# Patient Record
Sex: MALE | Race: WHITE | NOT HISPANIC OR LATINO | Employment: OTHER | URBAN - NONMETROPOLITAN AREA
[De-identification: names, ages, dates, MRNs, and addresses within clinical notes are randomized per-mention and may not be internally consistent; named-entity substitution may affect disease eponyms.]

---

## 2017-01-14 ENCOUNTER — HOSPITAL ENCOUNTER (OUTPATIENT)
Facility: HOSPITAL | Age: 82
Setting detail: OBSERVATION
Discharge: HOME | End: 2017-01-15
Attending: FAMILY MEDICINE | Admitting: FAMILY MEDICINE

## 2017-01-14 ENCOUNTER — APPOINTMENT (OUTPATIENT)
Dept: GENERAL RADIOLOGY | Facility: HOSPITAL | Age: 82
End: 2017-01-14

## 2017-01-14 ENCOUNTER — APPOINTMENT (OUTPATIENT)
Dept: CT IMAGING | Facility: HOSPITAL | Age: 82
End: 2017-01-14

## 2017-01-14 DIAGNOSIS — R42 DIZZINESS: ICD-10-CM

## 2017-01-14 DIAGNOSIS — R93.0 ABNORMAL CT SCAN, HEAD: ICD-10-CM

## 2017-01-14 DIAGNOSIS — R07.89 OTHER CHEST PAIN: Primary | ICD-10-CM

## 2017-01-14 PROBLEM — J32.9 SINUSITIS: Status: ACTIVE | Noted: 2017-01-14

## 2017-01-14 PROBLEM — I48.91 FIBRILLATION, ATRIAL (HCC): Status: ACTIVE | Noted: 2017-01-14

## 2017-01-14 PROBLEM — R07.9 CHEST PAIN: Status: ACTIVE | Noted: 2017-01-14

## 2017-01-14 PROBLEM — K21.00 REFLUX ESOPHAGITIS: Status: ACTIVE | Noted: 2017-01-14

## 2017-01-14 LAB
ALBUMIN SERPL-MCNC: 4.2 G/DL (ref 3.5–5)
ALBUMIN/GLOB SERPL: 1 G/DL (ref 1.1–2.5)
ALP SERPL-CCNC: 94 U/L (ref 24–120)
ALT SERPL W P-5'-P-CCNC: 29 U/L (ref 0–54)
AMYLASE SERPL-CCNC: 75 U/L (ref 30–110)
ANION GAP SERPL CALCULATED.3IONS-SCNC: 15 MMOL/L (ref 4–13)
APTT PPP: 41 SECONDS (ref 24.1–34.8)
AST SERPL-CCNC: 27 U/L (ref 7–45)
BASOPHILS # BLD AUTO: 0.05 10*3/MM3 (ref 0–0.2)
BASOPHILS NFR BLD AUTO: 0.5 % (ref 0–2)
BILIRUB SERPL-MCNC: 0.8 MG/DL (ref 0.1–1)
BILIRUB UR QL STRIP: NEGATIVE
BUN BLD-MCNC: 29 MG/DL (ref 5–21)
BUN/CREAT SERPL: 26.1 (ref 7–25)
CALCIUM SPEC-SCNC: 8.9 MG/DL (ref 8.4–10.4)
CHLORIDE SERPL-SCNC: 100 MMOL/L (ref 98–110)
CK MB SERPL-CCNC: 0.98 NG/ML (ref 0–5)
CK SERPL-CCNC: 33 U/L (ref 0–203)
CLARITY UR: CLEAR
CO2 SERPL-SCNC: 28 MMOL/L (ref 24–31)
COLOR UR: YELLOW
CREAT BLD-MCNC: 1.11 MG/DL (ref 0.5–1.4)
DEPRECATED RDW RBC AUTO: 42.4 FL (ref 40–54)
EOSINOPHIL # BLD AUTO: 0.11 10*3/MM3 (ref 0–0.7)
EOSINOPHIL NFR BLD AUTO: 1.1 % (ref 0–4)
ERYTHROCYTE [DISTWIDTH] IN BLOOD BY AUTOMATED COUNT: 13.1 % (ref 12–15)
ERYTHROCYTE [SEDIMENTATION RATE] IN BLOOD: 19 MM/HR (ref 0–15)
FLUAV AG NPH QL: NEGATIVE
FLUBV AG NPH QL IA: NEGATIVE
GFR SERPL CREATININE-BSD FRML MDRD: 63 ML/MIN/1.73
GLOBULIN UR ELPH-MCNC: 4.1 GM/DL
GLUCOSE BLD-MCNC: 102 MG/DL (ref 70–100)
GLUCOSE UR STRIP-MCNC: NEGATIVE MG/DL
HCT VFR BLD AUTO: 36.6 % (ref 40–52)
HGB BLD-MCNC: 12.2 G/DL (ref 14–18)
HGB UR QL STRIP.AUTO: NEGATIVE
IMM GRANULOCYTES # BLD: 0.02 10*3/MM3 (ref 0–0.03)
IMM GRANULOCYTES NFR BLD: 0.2 % (ref 0–5)
INR PPP: 1.36 (ref 0.91–1.09)
KETONES UR QL STRIP: NEGATIVE
LEUKOCYTE ESTERASE UR QL STRIP.AUTO: NEGATIVE
LIPASE SERPL-CCNC: 141 U/L (ref 23–203)
LYMPHOCYTES # BLD AUTO: 4.08 10*3/MM3 (ref 0.72–4.86)
LYMPHOCYTES NFR BLD AUTO: 40.4 % (ref 15–45)
MCH RBC QN AUTO: 29.6 PG (ref 28–32)
MCHC RBC AUTO-ENTMCNC: 33.3 G/DL (ref 33–36)
MCV RBC AUTO: 88.8 FL (ref 82–95)
MONOCYTES # BLD AUTO: 0.7 10*3/MM3 (ref 0.19–1.3)
MONOCYTES NFR BLD AUTO: 6.9 % (ref 4–12)
NEUTROPHILS # BLD AUTO: 5.13 10*3/MM3 (ref 1.87–8.4)
NEUTROPHILS NFR BLD AUTO: 50.9 % (ref 39–78)
NITRITE UR QL STRIP: NEGATIVE
NT-PROBNP SERPL-MCNC: 1670 PG/ML (ref 0–1800)
PH UR STRIP.AUTO: 6.5 [PH] (ref 5–8)
PLATELET # BLD AUTO: 281 10*3/MM3 (ref 130–400)
PMV BLD AUTO: 9.5 FL (ref 6–12)
POTASSIUM BLD-SCNC: 4.2 MMOL/L (ref 3.5–5.3)
PROT SERPL-MCNC: 8.3 G/DL (ref 6.3–8.7)
PROT UR QL STRIP: NEGATIVE
PROTHROMBIN TIME: 17.2 SECONDS (ref 11.9–14.6)
RBC # BLD AUTO: 4.12 10*6/MM3 (ref 4.8–5.9)
SODIUM BLD-SCNC: 143 MMOL/L (ref 135–145)
SP GR UR STRIP: 1.02 (ref 1–1.03)
TROPONIN I SERPL-MCNC: 0 NG/ML (ref 0–0.07)
TROPONIN I SERPL-MCNC: 0 NG/ML (ref 0–0.07)
TROPONIN I SERPL-MCNC: 0.01 NG/ML (ref 0–0.03)
TROPONIN I SERPL-MCNC: 0.02 NG/ML (ref 0–0.03)
TROPONIN I SERPL-MCNC: <0.012 NG/ML (ref 0–0.03)
UROBILINOGEN UR QL STRIP: NORMAL
WBC NRBC COR # BLD: 10.09 10*3/MM3 (ref 4.8–10.8)

## 2017-01-14 PROCEDURE — 25010000002 ONDANSETRON PER 1 MG: Performed by: PHYSICIAN ASSISTANT

## 2017-01-14 PROCEDURE — 87040 BLOOD CULTURE FOR BACTERIA: CPT | Performed by: PHYSICIAN ASSISTANT

## 2017-01-14 PROCEDURE — 83880 ASSAY OF NATRIURETIC PEPTIDE: CPT | Performed by: PHYSICIAN ASSISTANT

## 2017-01-14 PROCEDURE — 87804 INFLUENZA ASSAY W/OPTIC: CPT | Performed by: PHYSICIAN ASSISTANT

## 2017-01-14 PROCEDURE — 70450 CT HEAD/BRAIN W/O DYE: CPT

## 2017-01-14 PROCEDURE — 84484 ASSAY OF TROPONIN QUANT: CPT

## 2017-01-14 PROCEDURE — G0378 HOSPITAL OBSERVATION PER HR: HCPCS

## 2017-01-14 PROCEDURE — 80053 COMPREHEN METABOLIC PANEL: CPT | Performed by: PHYSICIAN ASSISTANT

## 2017-01-14 PROCEDURE — 93010 ELECTROCARDIOGRAM REPORT: CPT | Performed by: INTERNAL MEDICINE

## 2017-01-14 PROCEDURE — 84484 ASSAY OF TROPONIN QUANT: CPT | Performed by: FAMILY MEDICINE

## 2017-01-14 PROCEDURE — 96374 THER/PROPH/DIAG INJ IV PUSH: CPT

## 2017-01-14 PROCEDURE — 96365 THER/PROPH/DIAG IV INF INIT: CPT

## 2017-01-14 PROCEDURE — 93005 ELECTROCARDIOGRAM TRACING: CPT | Performed by: PHYSICIAN ASSISTANT

## 2017-01-14 PROCEDURE — 85730 THROMBOPLASTIN TIME PARTIAL: CPT | Performed by: PHYSICIAN ASSISTANT

## 2017-01-14 PROCEDURE — 81003 URINALYSIS AUTO W/O SCOPE: CPT | Performed by: PHYSICIAN ASSISTANT

## 2017-01-14 PROCEDURE — 25010000002 CEFTRIAXONE: Performed by: FAMILY MEDICINE

## 2017-01-14 PROCEDURE — 85651 RBC SED RATE NONAUTOMATED: CPT | Performed by: PHYSICIAN ASSISTANT

## 2017-01-14 PROCEDURE — 82553 CREATINE MB FRACTION: CPT | Performed by: FAMILY MEDICINE

## 2017-01-14 PROCEDURE — 83690 ASSAY OF LIPASE: CPT | Performed by: PHYSICIAN ASSISTANT

## 2017-01-14 PROCEDURE — 96375 TX/PRO/DX INJ NEW DRUG ADDON: CPT

## 2017-01-14 PROCEDURE — 99284 EMERGENCY DEPT VISIT MOD MDM: CPT

## 2017-01-14 PROCEDURE — 71010 HC CHEST PA OR AP: CPT

## 2017-01-14 PROCEDURE — 96366 THER/PROPH/DIAG IV INF ADDON: CPT

## 2017-01-14 PROCEDURE — 82150 ASSAY OF AMYLASE: CPT | Performed by: PHYSICIAN ASSISTANT

## 2017-01-14 PROCEDURE — 85025 COMPLETE CBC W/AUTO DIFF WBC: CPT | Performed by: PHYSICIAN ASSISTANT

## 2017-01-14 PROCEDURE — 85610 PROTHROMBIN TIME: CPT | Performed by: PHYSICIAN ASSISTANT

## 2017-01-14 PROCEDURE — 82550 ASSAY OF CK (CPK): CPT | Performed by: FAMILY MEDICINE

## 2017-01-14 PROCEDURE — 96361 HYDRATE IV INFUSION ADD-ON: CPT

## 2017-01-14 PROCEDURE — 93005 ELECTROCARDIOGRAM TRACING: CPT

## 2017-01-14 RX ORDER — SODIUM CHLORIDE 9 MG/ML
125 INJECTION, SOLUTION INTRAVENOUS CONTINUOUS
Status: DISCONTINUED | OUTPATIENT
Start: 2017-01-14 | End: 2017-01-14

## 2017-01-14 RX ORDER — LABETALOL HYDROCHLORIDE 5 MG/ML
10 INJECTION, SOLUTION INTRAVENOUS ONCE
Status: DISCONTINUED | OUTPATIENT
Start: 2017-01-14 | End: 2017-01-14

## 2017-01-14 RX ORDER — ONDANSETRON 2 MG/ML
4 INJECTION INTRAMUSCULAR; INTRAVENOUS EVERY 6 HOURS PRN
Status: DISCONTINUED | OUTPATIENT
Start: 2017-01-14 | End: 2017-01-15 | Stop reason: HOSPADM

## 2017-01-14 RX ORDER — ONDANSETRON 2 MG/ML
4 INJECTION INTRAMUSCULAR; INTRAVENOUS ONCE
Status: COMPLETED | OUTPATIENT
Start: 2017-01-14 | End: 2017-01-14

## 2017-01-14 RX ORDER — ONDANSETRON 4 MG/1
4 TABLET, FILM COATED ORAL EVERY 6 HOURS PRN
Status: DISCONTINUED | OUTPATIENT
Start: 2017-01-14 | End: 2017-01-15 | Stop reason: HOSPADM

## 2017-01-14 RX ORDER — TETRACAINE HYDROCHLORIDE 5 MG/ML
2 SOLUTION OPHTHALMIC ONCE
Status: COMPLETED | OUTPATIENT
Start: 2017-01-14 | End: 2017-01-14

## 2017-01-14 RX ORDER — SODIUM CHLORIDE 450 MG/100ML
50 INJECTION, SOLUTION INTRAVENOUS CONTINUOUS
Status: DISCONTINUED | OUTPATIENT
Start: 2017-01-14 | End: 2017-01-15 | Stop reason: HOSPADM

## 2017-01-14 RX ORDER — ONDANSETRON 4 MG/1
4 TABLET, ORALLY DISINTEGRATING ORAL EVERY 6 HOURS PRN
Status: DISCONTINUED | OUTPATIENT
Start: 2017-01-14 | End: 2017-01-15 | Stop reason: HOSPADM

## 2017-01-14 RX ORDER — SODIUM CHLORIDE 0.9 % (FLUSH) 0.9 %
1-10 SYRINGE (ML) INJECTION AS NEEDED
Status: DISCONTINUED | OUTPATIENT
Start: 2017-01-14 | End: 2017-01-15 | Stop reason: HOSPADM

## 2017-01-14 RX ORDER — PANTOPRAZOLE SODIUM 40 MG/10ML
40 INJECTION, POWDER, LYOPHILIZED, FOR SOLUTION INTRAVENOUS
Status: DISCONTINUED | OUTPATIENT
Start: 2017-01-15 | End: 2017-01-15 | Stop reason: HOSPADM

## 2017-01-14 RX ORDER — ATORVASTATIN CALCIUM 10 MG/1
10 TABLET, FILM COATED ORAL NIGHTLY
Status: DISCONTINUED | OUTPATIENT
Start: 2017-01-14 | End: 2017-01-15 | Stop reason: HOSPADM

## 2017-01-14 RX ADMIN — FLUORESCEIN SODIUM 1 STRIP: 1 STRIP OPHTHALMIC at 09:00

## 2017-01-14 RX ADMIN — TETRACAINE HYDROCHLORIDE 2 DROP: 5 SOLUTION OPHTHALMIC at 09:00

## 2017-01-14 RX ADMIN — ATORVASTATIN CALCIUM 10 MG: 10 TABLET, FILM COATED ORAL at 19:47

## 2017-01-14 RX ADMIN — CEFTRIAXONE 1 G: 1 INJECTION, POWDER, FOR SOLUTION INTRAMUSCULAR; INTRAVENOUS at 16:49

## 2017-01-14 RX ADMIN — SODIUM CHLORIDE 125 ML/HR: 9 INJECTION, SOLUTION INTRAVENOUS at 08:59

## 2017-01-14 RX ADMIN — ONDANSETRON 4 MG: 2 INJECTION INTRAMUSCULAR; INTRAVENOUS at 08:59

## 2017-01-14 RX ADMIN — RIVAROXABAN 20 MG: 20 TABLET, FILM COATED ORAL at 17:45

## 2017-01-14 RX ADMIN — SODIUM CHLORIDE 50 ML/HR: 4.5 INJECTION, SOLUTION INTRAVENOUS at 16:49

## 2017-01-14 NOTE — PLAN OF CARE
Problem: Patient Care Overview (Adult)  Goal: Plan of Care Review    01/14/17 1518   Coping/Psychosocial Response Interventions   Plan Of Care Reviewed With patient;spouse   Patient Care Overview   Progress no change   Outcome Evaluation   Outcome Summary/Follow up Plan admission

## 2017-01-14 NOTE — ED PROVIDER NOTES
"Subjective   Patient is a 82 y.o. male presenting with dizziness.   Dizziness   Associated symptoms: chest pain, headaches, hearing loss, nausea, vomiting and weakness    Associated symptoms: no diarrhea      Patient is an 82-year-old  male is present ED with family.  Both are vague and difficult historians.  From what I can gather, the patient experienced dizziness 2 weeks ago, left-sided headache, fell, and impacted the left side of his head/left side of face.  He believes his upper respiratory congestion began than as well.  The patient would see his primary care physician about this.  He was given an injection of steriods, started on some sort of antibiotic (name unknown) and had a CT scan of his head ordered.  The patient completed a CT scan of his head as an outpatient procedure at Dr. Borja's office.  His family reports that he had sinus infections found on CT.  Because his dizziness and congestion still persisted, his family reports a second antibiotic was prescribed.  The patient has been coughing as well with \"the nastiest phlegm I have ever seen.\"  He denies any obvious blood.  His symptoms worsened.  He complains of some blurry vision. He denies visual loss. Family thought she saw redness to his left eye but deny any pupil abnormality that she is aware of.   When he began to vomit today and started to complain  of a funny feeling in his chest, his family brought him to the ER further evaluation.    She has a history of a stroke 2 years ago.  His family reports that he presented differently with those symptoms and he did not have any residuals.  She had noted that his blood pressure was elevated today.  She cannot recall what his baseline is.    They deny any fever.  They deny any urinary issues.  They do complain of weakness.    Review of Systems   Constitutional: Positive for activity change and fatigue. Negative for fever.   HENT: Positive for congestion and hearing loss.    Eyes: Positive " for visual disturbance.   Respiratory: Positive for cough.    Cardiovascular: Positive for chest pain.   Gastrointestinal: Positive for nausea and vomiting. Negative for constipation and diarrhea.   Genitourinary: Negative.    Neurological: Positive for dizziness, weakness, light-headedness and headaches.       Past Medical History   Diagnosis Date   • Atrial fibrillation    • Coronary artery disease    • Hard of hearing    • Hyperlipidemia    • Shingles      2 years ago   • Stroke        No Known Allergies    Past Surgical History   Procedure Laterality Date   • Cardiac surgery     • Cochlear implant revision       insertion       History reviewed. No pertinent family history.    Social History     Social History   • Marital status:      Spouse name: N/A   • Number of children: N/A   • Years of education: N/A     Social History Main Topics   • Smoking status: Never Smoker   • Smokeless tobacco: None   • Alcohol use No   • Drug use: No   • Sexual activity: Not Asked     Other Topics Concern   • None     Social History Narrative   • None       Prior to Admission medications    Medication Sig Start Date End Date Taking? Authorizing Provider   atorvastatin (LIPITOR) 10 MG tablet Take  by mouth Daily.    Historical Provider, MD   meclizine (ANTIVERT) 25 MG tablet Take 1 tablet by mouth 3 (Three) Times a Day As Needed for dizziness. 10/29/16   Kerline Pisano DO   ondansetron ODT (ZOFRAN-ODT) 4 MG disintegrating tablet Take 1 tablet by mouth Every 8 (Eight) Hours As Needed for nausea or vomiting. 10/29/16   Kerline Pisano DO   rivaroxaban (XARELTO) 20 MG tablet Take 20 mg by mouth Daily.    Historical Provider, MD       Medications   sodium chloride 0.9 % infusion (125 mL/hr Intravenous New Bag 1/14/17 0859)   labetalol (NORMODYNE,TRANDATE) injection 10 mg (not administered)   fluorescein ophthalmic strip 1 strip (1 strip Both Eyes Given 1/14/17 0900)   tetracaine (ALTACAINE) 0.5 % ophthalmic solution 2 drop  "(2 drops Left Eye Given 1/14/17 0900)   ondansetron (ZOFRAN) injection 4 mg (4 mg Intravenous Given 1/14/17 0859)       Visit Vitals   • /70   • Pulse 77   • Temp 98 °F (36.7 °C)   • Resp 16   • Ht 68\" (172.7 cm)   • Wt 148 lb (67.1 kg)   • SpO2 99%   • BMI 22.5 kg/m2         Objective   Physical Exam   Constitutional: He is oriented to person, place, and time. He appears well-developed and well-nourished.  Non-toxic appearance. He has a sickly appearance. He appears ill.   He was actively vomiting when I first arrived.   HENT:   Head: Normocephalic and atraumatic.   Nose: Nose normal.   Mouth/Throat: Oropharynx is clear and moist.   Cochlear implant noted on the left parietal region.    Patient has moderate to large amount of fluid behind both TMs.   Eyes: Conjunctivae, EOM and lids are normal. Left pupil is not reactive. Pupils are unequal.   Slit lamp exam:       The left eye shows no corneal abrasion, no corneal ulcer, no foreign body, no hyphema, no fluorescein uptake and no anterior chamber bulge.   Patient's left pupil is about 2 mm and did not react to light.  Patient's right pupil 3 mm and did react to light.   Neck: Normal range of motion. Neck supple. No tracheal deviation present.   Cardiovascular: Normal rate and intact distal pulses.  An irregularly irregular rhythm present. Exam reveals no gallop and no friction rub.    Murmur heard.   Systolic murmur is present with a grade of 4/6   Pulmonary/Chest: Effort normal and breath sounds normal. No respiratory distress. He has no wheezes. He has no rales. He exhibits no tenderness.   Abdominal: Soft. Bowel sounds are normal. He exhibits no distension and no mass. There is no tenderness. There is no rebound and no guarding.   Musculoskeletal: Normal range of motion. He exhibits no edema, tenderness or deformity.   Neurological: He is alert and oriented to person, place, and time. He has normal strength and normal reflexes. No sensory deficit. " Coordination normal. GCS eye subscore is 4. GCS verbal subscore is 5. GCS motor subscore is 6.   Skin: Skin is warm and dry. No rash noted. No erythema. No pallor.   Psychiatric: He has a normal mood and affect. His behavior is normal. Judgment and thought content normal.   Vitals reviewed.      Procedures         Lab Results (last 24 hours)     Procedure Component Value Units Date/Time    CBC & Differential [[id number]] Collected:  01/14/17 0851    Specimen:  Blood Updated:  01/14/17 0901    Narrative:       The following orders were created for panel order CBC & Differential.  Procedure                               Abnormality         Status                     ---------                               -----------         ------                     CBC Auto Differential[[id number]]         Abnormal            Final result                 Please view results for these tests on the individual orders.    Comprehensive Metabolic Panel [[id number]]  (Abnormal) Collected:  01/14/17 0851    Specimen:  Blood Updated:  01/14/17 0915     Glucose 102 (H) mg/dL      BUN 29 (H) mg/dL      Creatinine 1.11 mg/dL      Sodium 143 mmol/L      Potassium 4.2 mmol/L      Chloride 100 mmol/L      CO2 28.0 mmol/L      Calcium 8.9 mg/dL      Total Protein 8.3 g/dL      Albumin 4.20 g/dL      ALT (SGPT) 29 U/L      AST (SGOT) 27 U/L      Alkaline Phosphatase 94 U/L      Total Bilirubin 0.8 mg/dL      eGFR Non African Amer 63 mL/min/1.73      Globulin 4.1 gm/dL      A/G Ratio 1.0 (L) g/dL      BUN/Creatinine Ratio 26.1 (H)      Anion Gap 15.0 (H) mmol/L     Narrative:       The MDRD GFR formula is only valid for adults with stable renal function between ages 18 and 70.    Protime-INR [[id number]]  (Abnormal) Collected:  01/14/17 0851    Specimen:  Blood Updated:  01/14/17 0914     Protime 17.2 (H) Seconds      INR 1.36 (H)     aPTT [[id number]]  (Abnormal) Collected:  01/14/17 0851    Specimen:  Blood Updated:  01/14/17 0914     PTT 41.0 (H) seconds      Amylase [[id number]]  (Normal) Collected:  01/14/17 0851    Specimen:  Blood Updated:  01/14/17 0915     Amylase 75 U/L     Lipase [[id number]]  (Normal) Collected:  01/14/17 0851    Specimen:  Blood Updated:  01/14/17 0915     Lipase 141 U/L     BNP [[id number]]  (Normal) Collected:  01/14/17 0851    Specimen:  Blood Updated:  01/14/17 0924     proBNP 1670.0 pg/mL     CBC Auto Differential [[id number]]  (Abnormal) Collected:  01/14/17 0851    Specimen:  Blood Updated:  01/14/17 0901     WBC 10.09 10*3/mm3      RBC 4.12 (L) 10*6/mm3      Hemoglobin 12.2 (L) g/dL      Hematocrit 36.6 (L) %      MCV 88.8 fL      MCH 29.6 pg      MCHC 33.3 g/dL      RDW 13.1 %      RDW-SD 42.4 fl      MPV 9.5 fL      Platelets 281 10*3/mm3      Neutrophil % 50.9 %      Lymphocyte % 40.4 %      Monocyte % 6.9 %      Eosinophil % 1.1 %      Basophil % 0.5 %      Immature Grans % 0.2 %      Neutrophils, Absolute 5.13 10*3/mm3      Lymphocytes, Absolute 4.08 10*3/mm3      Monocytes, Absolute 0.70 10*3/mm3      Eosinophils, Absolute 0.11 10*3/mm3      Basophils, Absolute 0.05 10*3/mm3      Immature Grans, Absolute 0.02 10*3/mm3     Sedimentation Rate [[id number]]  (Abnormal) Collected:  01/14/17 0851    Specimen:  Blood Updated:  01/14/17 0912     Sed Rate 19 (H) mm/hr     Influenza Antigen [[id number]]  (Normal) Collected:  01/14/17 0901    Specimen:  Swab from Nasopharynx Updated:  01/14/17 1004     Influenza A Ag, EIA Negative      Influenza B Ag, EIA Negative     Narrative:         Recommend confirmation of negative results by viral culture or molecular assay.    Blood Culture With CATALINO [[id number]] Collected:  01/14/17 0928    Specimen:  Blood from Arm, Left Updated:  01/14/17 1048    POC Troponin, Rapid [[id number]]  (Normal) Collected:  01/14/17 1126    Specimen:  Blood Updated:  01/14/17 1140     Troponin I 0.00 ng/mL       Serial Number: [id number]    : [id number]             Ct Head Without Contrast    Result Date: 1/14/2017  Narrative: CT  BRAIN without contrast 1/14/2017 8:52 AM CST  HISTORY: Dizziness and vomiting.  COMPARISON: 10/28/2016  DLP: 737 mGy cm Automated exposure control was utilized to diminish patient radiation dose.  TECHNIQUE: Serial axial tomographic images of the brain were obtained without the use of intravenous contrast.  FINDINGS: The midline structures are nondisplaced. There is moderate cerebral and cerebellar volume loss, with an associated increase in the prominence of the ventricles and sulci. The basilar cisterns are normal in size and configuration. There is no evidence of intracranial hemorrhage or mass-effect. There is low attenuation in the periventricular white matter, consistent with chronic ischemic change. There are no abnormal extra-axial fluid collections. There is no evidence of tonsillar herniation.  The included orbits and their contents are unremarkable. There is near complete opacification of the right maxillary sinus, right ethmoid air cells and right frontal sinus. This is a new finding from the previous CT of 10/28/2016. There is associated infundibular widening. This may simply be related to chronic and acute sinus disease. However, given the progression and associated soft tissue density isn't may be worthwhile to follow-up with ENT consultation to more entirely exclude a neoplastic process. I do not see any evidence of eryn bone destruction. There is sclerotic change of the mastoid air cells with no aerated mastoid air cells remaining as was noted previously.. The visualized osseous structures and overlying soft tissues of the skull and face are intact.      Impression: Moderate cerebral and cerebellar volume loss with chronic microvascular disease but no evidence of acute intracranial process. 2. Previous right frontal subcortical infarct with more focal encephalomalacia present. There is no evidence of acute infarct or hemorrhage. 3. There has been progressive sinus disease involving the right  maxillary sinus, right ethmoid air cells and right frontal sinus with some widening of the infundibulum. There is also some soft tissue density within the nasal cavity. This may simply be related to progressive more chronic sinus disease but if not previously evaluated ENT consultation may be helpful for better characterization to more entirely exclude a neoplastic process.   This report was finalized on 01/14/2017 09:27 by Dr. Darek Mcgrath MD.    Xr Chest 1 View    Result Date: 1/14/2017  Narrative: HISTORY: Chest pain and dizziness  FINDINGS: Today's exam is compared to previous study of 10/28/2016. Portable upright radiograph of the chest demonstrates no evidence of acute cardiopulmonary disease. There is no effusion or free air present. The patient's undergone prior median sternotomy for cardiac bypass grafting.      Impression: 1.. No acute disease. This report was finalized on 01/14/2017 10:18 by Dr. Darek Mcgrath MD.      ED Course  ED Course     HEART Score  History: Slightly suspicious (+0)  ECG: Normal (+0)  Age: Greater than or equal to 65 (+2)  Risk Factors: 3 or more risk factors OR history of atherosclerotic disease (+2)  Troponin: Normal limit or lower (+0)  Total: 4        MDM  Number of Diagnoses or Management Options    I have reassessed this patient several times and updated the family on test results.  The patient does report feeling bit better but is concerned given his risk factors.    I have contacted stefan Saunders on-call who would like the patient admitted under Dr. Kevin services.    Final diagnoses:   Other chest pain   Dizziness   Abnormal CT scan, head          [facility] PATRICK Shi  01/14/17 1083

## 2017-01-14 NOTE — H&P
"    [facility]  HISTORY AND PHYSICAL    Date of Admission: 1/14/2017  Primary Care Physician: Zia Benitez MD    Subjective     Chief Complaint: Chest pain    History of Present Illness  Patient is an 82-year-old  male is present ED with family. Both are vague and difficult historians. From what I can gather, the patient experienced dizziness 2 weeks ago, left-sided headache, fell, and impacted the left side of his head/left side of face. He believes his upper respiratory congestion began than as well. The patient would see his primary care physician about this. He was given an injection of steriods, started on some sort of antibiotic (name unknown) and had a CT scan of his head ordered. The patient completed a CT scan of his head as an outpatient procedure at Dr. Borja's office. His family reports that he had sinus infections found on CT. Because his dizziness and congestion still persisted, his family reports a second antibiotic was prescribed. The patient has been coughing as well with \"the nastiest phlegm I have ever seen.\" He denies any obvious blood. His symptoms worsened. He complains of some blurry vision. He denies visual loss. Family thought she saw redness to his left eye but deny any pupil abnormality that she is aware of.   When he began to vomit today and started to complain of a funny feeling in his chest, his family brought him to the ER further evaluation.     She has a history of a stroke 2 years ago. His family reports that he presented differently with those symptoms and he did not have any residuals. She had noted that his blood pressure was elevated today. She cannot recall what his baseline is.     They deny any fever. They deny any urinary issues. They do complain of weakness.  Patient main complaint is head congestion, denies any chest pain at this time, denies any shortness breath at this time.  Patient has cochlear " implant to the left ear.  Very hard of hearing.  His wife is his .  With the patient to [facility] for cardiac workup and an echocardiogram.      Review of Systems   Constitutional: Negative for activity change, appetite change, chills and fever.   HENT: Negative for hearing loss, nosebleeds, tinnitus and trouble swallowing.    Eyes: Negative for visual disturbance.   Respiratory: Negative for cough, chest tightness, shortness of breath and wheezing.    Cardiovascular: Negative for chest pain, palpitations and leg swelling.   Gastrointestinal: Negative for abdominal distention, abdominal pain, blood in stool, constipation, diarrhea, nausea and vomiting.   Endocrine: Negative for cold intolerance, heat intolerance, polydipsia, polyphagia and polyuria.   Genitourinary: Negative for decreased urine volume, difficulty urinating, dysuria, flank pain, frequency and hematuria.   Musculoskeletal: Negative for arthralgias, joint swelling and myalgias.   Skin: Negative for rash.   Allergic/Immunologic: Negative for immunocompromised state.   Neurological: Negative for dizziness, syncope, weakness, light-headedness and headaches.   Hematological: Negative for adenopathy. Does not bruise/bleed easily.   Psychiatric/Behavioral: Negative for confusion and sleep disturbance. The patient is not nervous/anxious.       *  Otherwise complete ROS reviewed and negative except as mentioned in the HPI.      Past Medical History:   Past Medical History   Diagnosis Date   • Atrial fibrillation    • Coronary artery disease    • Hard of hearing    • Hyperlipidemia    • Shingles      2 years ago   • Stroke        Past Surgical History:  Past Surgical History   Procedure Laterality Date   • Cardiac surgery     • Cochlear implant revision       insertion       Family History: family history includes No Known Problems in his father and mother.    Social History:  reports that he has never smoked. He does not have any  "smokeless tobacco history on file. He reports that he does not drink alcohol or use illicit drugs.    Medications:  Prior to Admission medications    Medication Sig Start Date End Date Taking? Authorizing Provider   atorvastatin (LIPITOR) 10 MG tablet Take  by mouth Daily.   Yes Historical Provider, MD   ondansetron ODT (ZOFRAN-ODT) 4 MG disintegrating tablet Take 1 tablet by mouth Every 8 (Eight) Hours As Needed for nausea or vomiting. 10/29/16  Yes Kerline Pisano DO   rivaroxaban (XARELTO) 20 MG tablet Take 20 mg by mouth Daily.   Yes Historical Provider, MD   meclizine (ANTIVERT) 25 MG tablet Take 1 tablet by mouth 3 (Three) Times a Day As Needed for dizziness. 10/29/16   Kerline Pisano DO     Allergies:  No Known Allergies    Objective     Vital Signs:   Visit Vitals   • /70   • Pulse 77   • Temp 98 °F (36.7 °C)   • Resp 16   • Ht 68\" (172.7 cm)   • Wt 148 lb (67.1 kg)   • SpO2 99%   • BMI 22.5 kg/m2     Physical Exam   Constitutional: He is oriented to person, place, and time. He appears well-developed and well-nourished.   HENT:   Head: Normocephalic and atraumatic.   Eyes: Conjunctivae and EOM are normal. Pupils are equal, round, and reactive to light.   Neck: Neck supple. No JVD present. No thyromegaly present.   Cardiovascular: Normal rate and intact distal pulses.  Exam reveals no gallop and no friction rub.    Murmur heard.  Irregular   Pulmonary/Chest: Effort normal and breath sounds normal. No respiratory distress. He has no wheezes. He has no rales. He exhibits no tenderness.   Abdominal: Soft. Bowel sounds are normal. He exhibits no distension. There is no tenderness. There is no rebound and no guarding.   Musculoskeletal: Normal range of motion. He exhibits no edema, tenderness or deformity.   Lymphadenopathy:     He has no cervical adenopathy.   Neurological: He is alert and oriented to person, place, and time. He displays normal reflexes. No cranial nerve deficit. He exhibits normal " muscle tone.   Skin: Skin is warm and dry. No rash noted.   Psychiatric: He has a normal mood and affect. His behavior is normal. Judgment and thought content normal.   Nursing note and vitals reviewed.      Results Reviewed:    Lab Results (last 24 hours)     Procedure Component Value Units Date/Time    CBC & Differential [[id number]] Collected:  01/14/17 0851    Specimen:  Blood Updated:  01/14/17 0901    Narrative:       The following orders were created for panel order CBC & Differential.  Procedure                               Abnormality         Status                     ---------                               -----------         ------                     CBC Auto Differential[[id number]]         Abnormal            Final result                 Please view results for these tests on the individual orders.    CBC Auto Differential [[id number]]  (Abnormal) Collected:  01/14/17 0851    Specimen:  Blood Updated:  01/14/17 0901     WBC 10.09 10*3/mm3      RBC 4.12 (L) 10*6/mm3      Hemoglobin 12.2 (L) g/dL      Hematocrit 36.6 (L) %      MCV 88.8 fL      MCH 29.6 pg      MCHC 33.3 g/dL      RDW 13.1 %      RDW-SD 42.4 fl      MPV 9.5 fL      Platelets 281 10*3/mm3      Neutrophil % 50.9 %      Lymphocyte % 40.4 %      Monocyte % 6.9 %      Eosinophil % 1.1 %      Basophil % 0.5 %      Immature Grans % 0.2 %      Neutrophils, Absolute 5.13 10*3/mm3      Lymphocytes, Absolute 4.08 10*3/mm3      Monocytes, Absolute 0.70 10*3/mm3      Eosinophils, Absolute 0.11 10*3/mm3      Basophils, Absolute 0.05 10*3/mm3      Immature Grans, Absolute 0.02 10*3/mm3     Sedimentation Rate [[id number]]  (Abnormal) Collected:  01/14/17 0851    Specimen:  Blood Updated:  01/14/17 0912     Sed Rate 19 (H) mm/hr     Protime-INR [[id number]]  (Abnormal) Collected:  01/14/17 0851    Specimen:  Blood Updated:  01/14/17 0914     Protime 17.2 (H) Seconds      INR 1.36 (H)     aPTT [[id number]]  (Abnormal) Collected:  01/14/17 0851    Specimen:  Blood  Updated:  01/14/17 0914     PTT 41.0 (H) seconds     Amylase [[id number]]  (Normal) Collected:  01/14/17 0851    Specimen:  Blood Updated:  01/14/17 0915     Amylase 75 U/L     Lipase [[id number]]  (Normal) Collected:  01/14/17 0851    Specimen:  Blood Updated:  01/14/17 0915     Lipase 141 U/L     Comprehensive Metabolic Panel [[id number]]  (Abnormal) Collected:  01/14/17 0851    Specimen:  Blood Updated:  01/14/17 0915     Glucose 102 (H) mg/dL      BUN 29 (H) mg/dL      Creatinine 1.11 mg/dL      Sodium 143 mmol/L      Potassium 4.2 mmol/L      Chloride 100 mmol/L      CO2 28.0 mmol/L      Calcium 8.9 mg/dL      Total Protein 8.3 g/dL      Albumin 4.20 g/dL      ALT (SGPT) 29 U/L      AST (SGOT) 27 U/L      Alkaline Phosphatase 94 U/L      Total Bilirubin 0.8 mg/dL      eGFR Non African Amer 63 mL/min/1.73      Globulin 4.1 gm/dL      A/G Ratio 1.0 (L) g/dL      BUN/Creatinine Ratio 26.1 (H)      Anion Gap 15.0 (H) mmol/L     Narrative:       The MDRD GFR formula is only valid for adults with stable renal function between ages 18 and 70.    BNP [[id number]]  (Normal) Collected:  01/14/17 0851    Specimen:  Blood Updated:  01/14/17 0924     proBNP 1670.0 pg/mL     Influenza Antigen [[id number]]  (Normal) Collected:  01/14/17 0901    Specimen:  Swab from Nasopharynx Updated:  01/14/17 1004     Influenza A Ag, EIA Negative      Influenza B Ag, EIA Negative     Narrative:         Recommend confirmation of negative results by viral culture or molecular assay.    Blood Culture With CATALINO [[id number]] Collected:  01/14/17 0928    Specimen:  Blood from Arm, Left Updated:  01/14/17 1048    POC Troponin, Rapid [[id number]]  (Normal) Collected:  01/14/17 1126    Specimen:  Blood Updated:  01/14/17 1140     Troponin I 0.00 ng/mL       Serial Number: [id number]    : [id number]              Radiology Data:    Imaging Results (last 24 hours)     Procedure Component Value Units Date/Time    CT Head Without Contrast [[id number]] Collected:   01/14/17 0920     Updated:  01/14/17 0929    Narrative:       CT BRAIN without contrast 1/14/2017 8:52 AM CST     HISTORY: Dizziness and vomiting.     COMPARISON: 10/28/2016      DLP: 737 mGy cm Automated exposure control was utilized to diminish  patient radiation dose.     TECHNIQUE: Serial axial tomographic images of the brain were obtained  without the use of intravenous contrast.      FINDINGS:   The midline structures are nondisplaced. There is moderate cerebral and  cerebellar volume loss, with an associated increase in the prominence of  the ventricles and sulci. The basilar cisterns are normal in size and  configuration. There is no evidence of intracranial hemorrhage or  mass-effect. There is low attenuation in the periventricular white  matter, consistent with chronic ischemic change. There are no abnormal  extra-axial fluid collections. There is no evidence of tonsillar  herniation.      The included orbits and their contents are unremarkable. There is near  complete opacification of the right maxillary sinus, right ethmoid air  cells and right frontal sinus. This is a new finding from the previous  CT of 10/28/2016. There is associated infundibular widening. This may  simply be related to chronic and acute sinus disease. However, given the  progression and associated soft tissue density isn't may be worthwhile  to follow-up with ENT consultation to more entirely exclude a neoplastic  process. I do not see any evidence of eryn bone destruction. There is  sclerotic change of the mastoid air cells with no aerated mastoid air  cells remaining as was noted previously.. The visualized osseous  structures and overlying soft tissues of the skull and face are intact.        Impression:       Moderate cerebral and cerebellar volume loss with chronic microvascular  disease but no evidence of acute intracranial process. 2. Previous right  frontal subcortical infarct with more focal encephalomalacia present.  There  is no evidence of acute infarct or hemorrhage.  3. There has been progressive sinus disease involving the right  maxillary sinus, right ethmoid air cells and right frontal sinus with  some widening of the infundibulum. There is also some soft tissue  density within the nasal cavity. This may simply be related to  progressive more chronic sinus disease but if not previously evaluated  ENT consultation may be helpful for better characterization to more  entirely exclude a neoplastic process.        This report was finalized on 01/14/2017 09:27 by Dr. Darek Mcgrath MD.    XR Chest 1 View [[id number]] Collected:  01/14/17 1017     Updated:  01/14/17 1020    Narrative:       HISTORY: Chest pain and dizziness     FINDINGS: Today's exam is compared to previous study of 10/28/2016.  Portable upright radiograph of the chest demonstrates no evidence of  acute cardiopulmonary disease. There is no effusion or free air present.  The patient's undergone prior median sternotomy for cardiac bypass  grafting.       Impression:       1.. No acute disease.  This report was finalized on 01/14/2017 10:18 by Dr. Darek Mcgrath MD.          I have personally reviewed and interpreted the radiology studies and ECG obtained at time of admission.     Assessment / Plan      Assessment & Plan  Hospital Problem List     Chest pain    Sinusitis    Fibrillation, atrial    Reflux esophagitis        Plans    Vital Signs-Every 4 Hours     Strict Intake and Output-Every Hour     Weigh patient-Once     Oxygen Therapy--Continuous     Insert Peripheral IV-Once     Saline Lock & Maintain IV Access-Continuous     sodium chloride 0.9 % flush 1-10 mL-As Needed     Full Code-Continuous     Diet Regular; Cardiac-Diet Effective Now     CBC Auto Differential-Morning Draw     Comprehensive Metabolic Panel-Morning Draw     TSH-Morning Draw     Lipid Panel-Morning Draw     CK Total & CKMB-Once     Troponin-Now Then Every 3 Hours     Echocardiogram Doppler-Once      enoxaparin (LOVENOX) syringe 40 mg-Every 24 Hours     Place Sequential Compression Device-Once     sodium chloride 0.45 % infusion-Continuous     ondansetron (ZOFRAN) tablet 4 mg-Every 6 Hours PRN     ondansetron ODT (ZOFRAN-ODT) disintegrating tablet 4 mg-Every 6 Hours PRN     ondansetron (ZOFRAN) injection 4 mg-Every 6 Hours PRN     cefTRIAXone (ROCEPHIN) 1 g/100 mL 0.9% NS (MBP)-Every 24 Hours     pantoprazole (PROTONIX) injection 40 mg-Every Early Morning     Cardiac Monitoring-Continuous     Pulse Oximetry,  Spot-Once                 Cardiac monitoring Start: 01/14/17 0849, End: 01/14/17 0849, Once, STAT           Urinalysis With / Culture If Indicated Start: 01/14/17 0847, End: 01/14/17 0847, Once, R           labetalol (NORMODYNE,TRANDATE) injection 10 mg Start: 01/14/17 0924, 10 mg, Intravenous, Once, STAT           POCT Troponin, Rapid Start: 01/14/17 1103, End: 01/14/17 1403, Now Then Every 3 Hours, STAT           sodium chloride 0.9 % infusion Start: 01/14/17 0850, 125 mL/hr, Intravenous, Continuous, STAT           Visual Acuity Screening Start: 01/14/17 0848, End: 01/14/17 0848, Once, R           atorvastatin (LIPITOR) tablet 10 mg Start: Signed and Held, 10 mg, Oral, Daily, R       Reordered from: atorvastatin (LIPITOR) 10 MG tablet - Daily Start: 0000, Oral, Daily, R         rivaroxaban (XARELTO) tablet 20 mg Start: Signed and Held, 20 mg, Oral, Daily, R       Reordered from: rivaroxaban (XARELTO) 20 MG tablet - Daily Start: 0000, 20 mg, Oral, Daily, R                       Code Status: full     I discussed the patients findings and my recommendations with: patients    Estimated length of stay: 1-2 days    Higinio Kevin MD   01/14/17   12:33 PM

## 2017-01-14 NOTE — IP AVS SNAPSHOT
AFTER VISIT SUMMARY             Alejandro Gerber           About your hospitalization     You were admitted on:  January 14, 2017 You last received care in the:  [facility]       Procedures & Surgeries         Medications    If you or your caregiver advised us that you are currently taking a medication and that medication is marked below as “Resume”, this simply indicates that we have reviewed those medications to make sure our new therapy recommendations do not interfere.  If you have concerns about medications other than those new ones which we are prescribing today, please consult the physician who prescribed them (or your primary physician).  Our review of your home medications is not meant to indicate that we are directing their use.             Your Medications      START taking these medications     cefdinir 300 MG capsule   Take 1 capsule by mouth 2 (Two) Times a Day.   Commonly known as:  OMNICEF           pantoprazole 40 MG EC tablet   Take 1 tablet by mouth Daily.   Commonly known as:  PROTONIX             CONTINUE taking these medications     atorvastatin 10 MG tablet   Take  by mouth Daily.   Last time this was given:  1/14/2017  7:47 PM   Commonly known as:  LIPITOR           ondansetron ODT 4 MG disintegrating tablet   Take 1 tablet by mouth Every 8 (Eight) Hours As Needed for nausea or vomiting.   Commonly known as:  ZOFRAN-ODT           rivaroxaban 20 MG tablet   Take 20 mg by mouth Daily.   Last time this was given:  1/15/2017  8:26 AM   Commonly known as:  XARELTO             STOP taking these medications     meclizine 25 MG tablet   Commonly known as:  ANTIVERT                Where to Get Your Medications      These medications were sent to [facility] - [address] - [phone]  - [phone] [address] [address]     Phone:  [phone]     cefdinir 300 MG capsule    pantoprazole 40 MG EC tablet                  Your Medications      Your  Medication List           Morning Noon Evening Bedtime As Needed    atorvastatin 10 MG tablet   Take  by mouth Daily.   Commonly known as:  LIPITOR                                cefdinir 300 MG capsule   Take 1 capsule by mouth 2 (Two) Times a Day.   Commonly known as:  OMNICEF                                ondansetron ODT 4 MG disintegrating tablet   Take 1 tablet by mouth Every 8 (Eight) Hours As Needed for nausea or vomiting.   Commonly known as:  ZOFRAN-ODT                                pantoprazole 40 MG EC tablet   Take 1 tablet by mouth Daily.   Commonly known as:  PROTONIX                                rivaroxaban 20 MG tablet   Take 20 mg by mouth Daily.   Commonly known as:  XARELTO                                         Instructions for After Discharge        Discharge References/Attachments     CEFDINIR CAPSULES (ENGLISH)    PANTOPRAZOLE TABLETS (ENGLISH)    SINUSITIS, EASY-TO-READ (ENGLISH)    PHARMACOLOGIC STRESS ECHOCARDIOGRAM, EASY-TO-READ (ENGLISH)       Follow-ups for After Discharge        Follow-up Information     Follow up with Zia Benitez MD Follow up in 1 week(s).    Specialty:  Internal Medicine    Contact information:    [address] [place], [place] [id number] [phone]        Referrals and Follow-ups to Schedule     Additional Follow-Up    As directed    1 week   Follow Up Details:  PCP       Follow-Up    As directed    As soon as possible   Follow Up Details:  Please make appoint for patient to see HEENT specialist as an outpatient for chronic sinusitis             MyChart Signup     Our records indicate that you have declined [employer] MyChart signup. If you would like to sign up for [facility], please email [email or web address] or call [phone] to obtain an activation code.         Summary of Your Hospitalization        Reason for Hospitalization     Your primary diagnosis was:  Not on File    Your diagnoses also included:  Chest Pain, Sinus Infection,  Atrial Fibrillation (Irregular Heartbeat), Inflammation Of The Esophagus      Care Providers     Provider Service Role Specialty    Higinio Kevin MD -- Attending Provider Hospitalist      Your Allergies  Date Reviewed: 1/15/2017    No active allergies      Pending Labs     Order Current Status    Blood Culture With CATALINO Preliminary result      Patient Belongings Returned     Document Return of Belongings Flowsheet     Were the patient bedside belongings sent home?   Yes   Belongings Retrieved from Security & Sent Home   N/A    Belongings Sent to Safe   --   Medications Retrieved from Pharmacy & Sent Home   N/A              More Information      Cefdinir capsules  What is this medicine?  CEFDINIR (SEF di ner) is a cephalosporin antibiotic. It is used to treat certain kinds of bacterial infections. It will not work for colds, flu, or other viral infections.  This medicine may be used for other purposes; ask your health care provider or pharmacist if you have questions.  What should I tell my health care provider before I take this medicine?  They need to know if you have any of these conditions:  -bleeding problems  -kidney disease  -stomach or intestine problems (especially colitis)  -an unusual or allergic reaction to cefdinir, other cephalosporin antibiotics, penicillin, penicillamine, other foods, dyes or preservatives  -pregnant or trying to get pregnant  -breast-feeding  How should I use this medicine?  Take this medicine by mouth. Swallow it with a drink of water. Follow the directions on the prescription label. You can take it with or without food. If it upsets your stomach it may help to take it with food. Take your doses at regular intervals. Do not take it more often than directed. Finish all the medicine you are prescribed even if you think your infection is better.  Talk to your pediatrician regarding the use of this medicine in children. Special care may be needed.  Overdosage: If you think you have  taken too much of this medicine contact a poison control center or emergency room at once.  NOTE: This medicine is only for you. Do not share this medicine with others.  What if I miss a dose?  If you miss a dose, take it as soon as you can. If it is almost time for your next dose, take only that dose. Do not take double or extra doses.  What may interact with this medicine?  -antacids that contain aluminum or magnesium  -iron supplements  -other antibiotics  -probenecid  This list may not describe all possible interactions. Give your health care provider a list of all the medicines, herbs, non-prescription drugs, or dietary supplements you use. Also tell them if you smoke, drink alcohol, or use illegal drugs. Some items may interact with your medicine.  What should I watch for while using this medicine?  Tell your doctor or health care professional if your symptoms do not get better in a few days.  If you are diabetic you may get a false-positive result for sugar in your urine. Check with your doctor or health care professional before you change your diet or the dose of your diabetes medicine.  What side effects may I notice from receiving this medicine?  Side effects that you should report to your doctor or health care professional as soon as possible:  -allergic reactions like skin rash, itching or hives, swelling of the face, lips, or tongue  -breathing problems  -fever or chills  -redness, blistering, peeling or loosening of the skin, including inside the mouth  -seizures  -severe or watery diarrhea  -sore throat  -swollen joints  -trouble passing urine or change in the amount of urine  -unusual bleeding or bruising  -unusually weak or tired  Side effects that usually do not require medical attention (report to your doctor or health care professional if they continue or are bothersome):  -constipation  -dizziness  -gas or heartburn  -headache  -loss of appetite  -nausea or vomiting  -stomach pain  -stool  discoloration  -vaginal itching  This list may not describe all possible side effects. Call your doctor for medical advice about side effects. You may report side effects to FDA at 7-616-FDA-5695.  Where should I keep my medicine?  Keep out of the reach of children.  Store at room temperature between 15 and 30 degrees C (59 and 86 degrees F). Throw the medicine away after the expiration date.  NOTE: This sheet is a summary. It may not cover all possible information. If you have questions about this medicine, talk to your doctor, pharmacist, or health care provider.     © 2016, [employer]. (2009-02-27 16:02:53)          Pantoprazole tablets  What is this medicine?  PANTOPRAZOLE (pan TOE pra zole) prevents the production of acid in the stomach. It is used to treat gastroesophageal reflux disease (GERD), inflammation of the esophagus, and Zollinger-Kaufman syndrome.  This medicine may be used for other purposes; ask your health care provider or pharmacist if you have questions.  What should I tell my health care provider before I take this medicine?  They need to know if you have any of these conditions:  -liver disease  -low levels of magnesium in the blood  -an unusual or allergic reaction to omeprazole, lansoprazole, pantoprazole, rabeprazole, other medicines, foods, dyes, or preservatives  -pregnant or trying to get pregnant  -breast-feeding  How should I use this medicine?  Take this medicine by mouth. Swallow the tablets whole with a drink of water. Follow the directions on the prescription label. Do not crush, break, or chew. Take your medicine at regular intervals. Do not take your medicine more often than directed.  Talk to your pediatrician regarding the use of this medicine in children. While this drug may be prescribed for children as young as 5 years for selected conditions, precautions do apply.  Overdosage: If you think you have taken too much of this medicine contact a poison control center  or emergency room at once.  NOTE: This medicine is only for you. Do not share this medicine with others.  What if I miss a dose?  If you miss a dose, take it as soon as you can. If it is almost time for your next dose, take only that dose. Do not take double or extra doses.  What may interact with this medicine?  Do not take this medicine with any of the following medications:  -atazanavir  -nelfinavir  This medicine may also interact with the following medications:  -ampicillin  -delavirdine  -erlotinib  -iron salts  -medicines for fungal infections like ketoconazole, itraconazole and voriconazole  -methotrexate  -mycophenolate mofetil  -warfarin  This list may not describe all possible interactions. Give your health care provider a list of all the medicines, herbs, non-prescription drugs, or dietary supplements you use. Also tell them if you smoke, drink alcohol, or use illegal drugs. Some items may interact with your medicine.  What should I watch for while using this medicine?  It can take several days before your stomach pain gets better. Check with your doctor or health care professional if your condition does not start to get better, or if it gets worse.  You may need blood work done while you are taking this medicine.  What side effects may I notice from receiving this medicine?  Side effects that you should report to your doctor or health care professional as soon as possible:  -allergic reactions like skin rash, itching or hives, swelling of the face, lips, or tongue  -bone, muscle or joint pain  -breathing problems  -chest pain or chest tightness  -dark yellow or brown urine  -dizziness  -fast, irregular heartbeat  -feeling faint or lightheaded  -fever or sore throat  -muscle spasm  -palpitations  -redness, blistering, peeling or loosening of the skin, including inside the mouth  -seizures  -tremors  -unusual bleeding or bruising  -unusually weak or tired  -yellowing of the eyes or skin  Side effects that  usually do not require medical attention (Report these to your doctor or health care professional if they continue or are bothersome.):  -constipation  -diarrhea  -dry mouth  -headache  -nausea  This list may not describe all possible side effects. Call your doctor for medical advice about side effects. You may report side effects to FDA at 6-228-FDA-8003.  Where should I keep my medicine?  Keep out of the reach of children.  Store at room temperature between 15 and 30 degrees C (59 and 86 degrees F). Protect from light and moisture. Throw away any unused medicine after the expiration date.  NOTE: This sheet is a summary. It may not cover all possible information. If you have questions about this medicine, talk to your doctor, pharmacist, or health care provider.     © 2016, Elsevier/Gold Standard. (2016-02-05 14:45:56)          Sinusitis, Adult  Sinusitis is redness, soreness, and puffiness (inflammation) of the air pockets in the bones of your face (sinuses). The redness, soreness, and puffiness can cause air and mucus to get trapped in your sinuses. This can allow germs to grow and cause an infection.   HOME CARE   · Drink enough fluids to keep your pee (urine) clear or pale yellow.  · Use a humidifier in your home.  · Run a hot shower to create steam in the bathroom. Sit in the bathroom with the door closed. Breathe in the steam 3-4 times a day.  · Put a warm, moist washcloth on your face 3-4 times a day, or as told by your doctor.  · Use salt water sprays (saline sprays) to wet the thick fluid in your nose. This can help the sinuses drain.  · Only take medicine as told by your doctor.  GET HELP RIGHT AWAY IF:   · Your pain gets worse.  · You have very bad headaches.  · You are sick to your stomach (nauseous).  · You throw up (vomit).  · You are very sleepy (drowsy) all the time.  · Your face is puffy (swollen).  · Your vision changes.  · You have a stiff neck.  · You have trouble breathing.  MAKE SURE YOU:    · Understand these instructions.  · Will watch your condition.  · Will get help right away if you are not doing well or get worse.     This information is not intended to replace advice given to you by your health care provider. Make sure you discuss any questions you have with your health care provider.     Document Released: 06/05/2009 Document Revised: 01/08/2016 Document Reviewed: 07/23/2013  [employer] Interactive Patient Education ©2016 Elsevier Inc.          Pharmacologic Stress Echocardiogram  A pharmacologic stress echocardiogram is a heart (cardiac) test used to look at how well your heart muscle and valves are working. For this test, a medicine is used to increase your heart rate and blood pressure. This medicine is used when you are not able to exercise on a treadmill. It makes your heart work in a way similar to exercise. An echocardiogram uses sound waves (ultrasound) to make an image of your heart.   BEFORE THE PROCEDURE  · Do not drink or eat foods with caffeine for 24 hours before the test.  · Follow your doctor's instructions about eating and drinking before the test.  · Ask your doctor what medicines you should or should not take before the test. Take your medicines with water unless told by your doctor not to.  · If you use an inhaler, bring it with you to the test.  · Bring a snack to eat after the stress part of your test.  · Do not  smoke for 4 hours before the test.  · Wear comfortable shoes and clothing.  PROCEDURE  · You will be hooked up to a monitor to watch your heart rate.  · A tube (IV) will be put in your hand or arm. The medicine will be given through the tube.  · The test will be done before the medicine is given while you are at rest. It will be done again after the medicine is given.  ¨ A clear gel is placed on your chest.  ¨ A wand-like device is moved over the gel on your chest.  ¨ Pictures are then taken using sound waves that come from the wand.  · The medicine will cause your  heart to beat as if you were exercising.  AFTER THE PROCEDURE  · Your heart rate and blood pressure will be watched.  · You may return to your normal diet, activities, and medicines as told by your doctor.     This information is not intended to replace advice given to you by your health care provider. Make sure you discuss any questions you have with your health care provider.     Document Released: 10/15/2010 Document Revised: 12/23/2014 Document Reviewed: 08/25/2014  [employer] Interactive Patient Education ©2016 Elsevier Inc.            SYMPTOMS OF A STROKE    Call 911 or have someone take you to the Emergency Department if you have any of the following:    · Sudden numbness or weakness of your face, arm or leg especially on one side of the body  · Sudden confusion, diffiiculty speaking or trouble understanding   · Changes in your vision or loss of sight in one eye  · Sudden severe headache with no known cause  · sudden dizziness, trouble walking, loss of balance or coordination    It is important to seek emergency care right away if you have further stroke symptoms. If you get emergency help quickly, the powerful clot-dissolving medicines can reduce the disabilities caused by a stroke.     For more information:    American Stroke Association  8-171-8-STROKE  www.strokeassociation.org           IF YOU SMOKE OR USE TOBACCO PLEASE READ THE FOLLOWING:    Why is smoking bad for me?  Smoking increases the risk of heart disease, lung disease, vascular disease, stroke, and cancer.     If you smoke, STOP!    If you would like more information on quitting smoking, please visit the [employer] website: [email or web address]/healthier-together/smoke   This link will provide additional resources including the QUIT line and the Beat the Pack support groups.     For more information:    American Cancer Society  (688) 724-4738    American Heart Association  1-960.409.1282               YOU ARE THE MOST  IMPORTANT FACTOR IN YOUR RECOVERY.     Follow all instructions carefully.     I have reviewed my discharge instructions with my nurse, including the following information, if applicable:     Information about my illness and diagnosis   Follow up appointments (including lab draws)   Wound Care   Equipment Needs   Medications (new and continuing) along with side effects   Preventative information such as vaccines and smoking cessations   Diet   Pain   I know when to contact my Doctor's office or seek emergency care      I want my nurse to describe the side effects of my medications: YES NO   If the answer is no, I understand the side effects of my medications: YES NO   My nurse described the side effects of my medications in a way that I could understand: YES NO   I have taken my personal belongings and my own medications with me at discharge: YES NO            I have received this information and my questions have been answered. I have discussed any concerns I see with this plan with the nurse or physician. I understand these instructions.    Signature of Patient or Responsible Person: _____________________________________    Date: _________________  Time: __________________    Signature of Healthcare Provider: _______________________________________  Date: _________________  Time: __________________

## 2017-01-15 ENCOUNTER — APPOINTMENT (OUTPATIENT)
Dept: CARDIOLOGY | Facility: HOSPITAL | Age: 82
End: 2017-01-15
Attending: FAMILY MEDICINE

## 2017-01-15 VITALS
BODY MASS INDEX: 22.29 KG/M2 | TEMPERATURE: 98.2 F | WEIGHT: 147.06 LBS | DIASTOLIC BLOOD PRESSURE: 80 MMHG | HEIGHT: 68 IN | OXYGEN SATURATION: 100 % | HEART RATE: 104 BPM | SYSTOLIC BLOOD PRESSURE: 136 MMHG | RESPIRATION RATE: 18 BRPM

## 2017-01-15 LAB
ALBUMIN SERPL-MCNC: 3.7 G/DL (ref 3.5–5)
ALBUMIN/GLOB SERPL: 1 G/DL (ref 1.1–2.5)
ALP SERPL-CCNC: 82 U/L (ref 24–120)
ALT SERPL W P-5'-P-CCNC: 33 U/L (ref 0–54)
ANION GAP SERPL CALCULATED.3IONS-SCNC: 11 MMOL/L (ref 4–13)
ARTICHOKE IGE QN: 64 MG/DL (ref 0–99)
AST SERPL-CCNC: 21 U/L (ref 7–45)
BASOPHILS # BLD AUTO: 0.05 10*3/MM3 (ref 0–0.2)
BASOPHILS NFR BLD AUTO: 0.5 % (ref 0–2)
BH CV ECHO MEAS - AI MAX PG: 56.9 MMHG
BH CV ECHO MEAS - AI MAX VEL: 377 CM/SEC
BH CV ECHO MEAS - AO MAX PG (FULL): 32.1 MMHG
BH CV ECHO MEAS - AO MAX PG: 36.5 MMHG
BH CV ECHO MEAS - AO MEAN PG (FULL): 15 MMHG
BH CV ECHO MEAS - AO MEAN PG: 17 MMHG
BH CV ECHO MEAS - AO ROOT AREA: 10.2 CM^2
BH CV ECHO MEAS - AO ROOT DIAM: 3.6 CM
BH CV ECHO MEAS - AO V2 MAX: 302 CM/SEC
BH CV ECHO MEAS - AO V2 MEAN: 193 CM/SEC
BH CV ECHO MEAS - AO V2 VTI: 62.9 CM
BH CV ECHO MEAS - AVA(I,A): 1.2 CM^2
BH CV ECHO MEAS - AVA(I,D): 1.2 CM^2
BH CV ECHO MEAS - AVA(V,A): 1.1 CM^2
BH CV ECHO MEAS - AVA(V,D): 1.1 CM^2
BH CV ECHO MEAS - CONTRAST EF 4CH: 59.5 ML/M^2
BH CV ECHO MEAS - EDV(CUBED): 74.1 ML
BH CV ECHO MEAS - EDV(MOD-SP4): 84 ML
BH CV ECHO MEAS - EDV(TEICH): 78.6 ML
BH CV ECHO MEAS - EF(CUBED): 51.5 %
BH CV ECHO MEAS - EF(MOD-SP4): 59.5 %
BH CV ECHO MEAS - EF(TEICH): 43.8 %
BH CV ECHO MEAS - ESV(CUBED): 35.9 ML
BH CV ECHO MEAS - ESV(MOD-SP4): 34 ML
BH CV ECHO MEAS - ESV(TEICH): 44.1 ML
BH CV ECHO MEAS - FS: 21.4 %
BH CV ECHO MEAS - IVS/LVPW: 0.92
BH CV ECHO MEAS - IVSD: 1.2 CM
BH CV ECHO MEAS - LA DIMENSION: 4.5 CM
BH CV ECHO MEAS - LA/AO: 1.3
BH CV ECHO MEAS - LAT PEAK E' VEL: 3.6 CM/SEC
BH CV ECHO MEAS - LV MASS(C)D: 189.2 GRAMS
BH CV ECHO MEAS - LV MAX PG: 4.4 MMHG
BH CV ECHO MEAS - LV MEAN PG: 2 MMHG
BH CV ECHO MEAS - LV V1 MAX: 105 CM/SEC
BH CV ECHO MEAS - LV V1 MEAN: 72.6 CM/SEC
BH CV ECHO MEAS - LV V1 VTI: 23.5 CM
BH CV ECHO MEAS - LVIDD: 4.2 CM
BH CV ECHO MEAS - LVIDS: 3.3 CM
BH CV ECHO MEAS - LVLD AP4: 7.8 CM
BH CV ECHO MEAS - LVLS AP4: 7 CM
BH CV ECHO MEAS - LVOT AREA (M): 3.1 CM^2
BH CV ECHO MEAS - LVOT AREA: 3.1 CM^2
BH CV ECHO MEAS - LVOT DIAM: 2 CM
BH CV ECHO MEAS - LVPWD: 1.3 CM
BH CV ECHO MEAS - MV A MAX VEL: 34.6 CM/SEC
BH CV ECHO MEAS - MV DEC TIME: 0.22 SEC
BH CV ECHO MEAS - MV E MAX VEL: 78 CM/SEC
BH CV ECHO MEAS - MV E/A: 2.3
BH CV ECHO MEAS - RAP SYSTOLE: 10 MMHG
BH CV ECHO MEAS - RVSP: 36.8 MMHG
BH CV ECHO MEAS - SV(AO): 640.2 ML
BH CV ECHO MEAS - SV(CUBED): 38.2 ML
BH CV ECHO MEAS - SV(LVOT): 73.8 ML
BH CV ECHO MEAS - SV(MOD-SP4): 50 ML
BH CV ECHO MEAS - SV(TEICH): 34.4 ML
BH CV ECHO MEAS - TR MAX VEL: 259 CM/SEC
BH CV STRESS BP STAGE 1: NORMAL
BH CV STRESS BP STAGE 2: NORMAL
BH CV STRESS DOSE DOBUTAMINE STAGE 1: 10
BH CV STRESS DOSE DOBUTAMINE STAGE 2: 20
BH CV STRESS DURATION MIN STAGE 1: 3
BH CV STRESS DURATION MIN STAGE 2: 2
BH CV STRESS DURATION SEC STAGE 1: 0
BH CV STRESS DURATION SEC STAGE 2: 25
BH CV STRESS HR STAGE 1: 80
BH CV STRESS HR STAGE 2: 136
BH CV STRESS PROTOCOL 1: NORMAL
BH CV STRESS RATE STAGE 1: 40
BH CV STRESS RATE STAGE 2: 80
BH CV STRESS RECOVERY BP: NORMAL MMHG
BH CV STRESS RECOVERY HR: 100 BPM
BH CV STRESS STAGE 1: 1
BH CV STRESS STAGE 2: 2
BILIRUB SERPL-MCNC: 0.8 MG/DL (ref 0.1–1)
BUN BLD-MCNC: 25 MG/DL (ref 5–21)
BUN/CREAT SERPL: 24 (ref 7–25)
CALCIUM SPEC-SCNC: 8.6 MG/DL (ref 8.4–10.4)
CHLORIDE SERPL-SCNC: 102 MMOL/L (ref 98–110)
CHOLEST SERPL-MCNC: 122 MG/DL (ref 130–200)
CO2 SERPL-SCNC: 26 MMOL/L (ref 24–31)
CREAT BLD-MCNC: 1.04 MG/DL (ref 0.5–1.4)
DEPRECATED RDW RBC AUTO: 41.9 FL (ref 40–54)
EOSINOPHIL # BLD AUTO: 0.11 10*3/MM3 (ref 0–0.7)
EOSINOPHIL NFR BLD AUTO: 1.1 % (ref 0–4)
ERYTHROCYTE [DISTWIDTH] IN BLOOD BY AUTOMATED COUNT: 13.1 % (ref 12–15)
GFR SERPL CREATININE-BSD FRML MDRD: 68 ML/MIN/1.73
GLOBULIN UR ELPH-MCNC: 3.6 GM/DL
GLUCOSE BLD-MCNC: 89 MG/DL (ref 70–100)
HCT VFR BLD AUTO: 33.7 % (ref 40–52)
HDLC SERPL-MCNC: 39 MG/DL
HGB BLD-MCNC: 11.2 G/DL (ref 14–18)
IMM GRANULOCYTES # BLD: 0.03 10*3/MM3 (ref 0–0.03)
IMM GRANULOCYTES NFR BLD: 0.3 % (ref 0–5)
LDLC/HDLC SERPL: 1.87 {RATIO}
LEFT ATRIUM VOLUME: 99 CM3
LV EF 2D ECHO EST: 60 %
LYMPHOCYTES # BLD AUTO: 2.75 10*3/MM3 (ref 0.72–4.86)
LYMPHOCYTES NFR BLD AUTO: 27.6 % (ref 15–45)
MAXIMAL PREDICTED HEART RATE: 138 BPM
MCH RBC QN AUTO: 29.6 PG (ref 28–32)
MCHC RBC AUTO-ENTMCNC: 33.2 G/DL (ref 33–36)
MCV RBC AUTO: 88.9 FL (ref 82–95)
MONOCYTES # BLD AUTO: 0.72 10*3/MM3 (ref 0.19–1.3)
MONOCYTES NFR BLD AUTO: 7.2 % (ref 4–12)
NEUTROPHILS # BLD AUTO: 6.29 10*3/MM3 (ref 1.87–8.4)
NEUTROPHILS NFR BLD AUTO: 63.3 % (ref 39–78)
PERCENT MAX PREDICTED HR: 98.55 %
PLATELET # BLD AUTO: 261 10*3/MM3 (ref 130–400)
PMV BLD AUTO: 9.7 FL (ref 6–12)
POTASSIUM BLD-SCNC: 4.8 MMOL/L (ref 3.5–5.3)
PROT SERPL-MCNC: 7.3 G/DL (ref 6.3–8.7)
RBC # BLD AUTO: 3.79 10*6/MM3 (ref 4.8–5.9)
SODIUM BLD-SCNC: 139 MMOL/L (ref 135–145)
STRESS BASELINE BP: NORMAL MMHG
STRESS BASELINE HR: 71 BPM
STRESS PERCENT HR: 116 %
STRESS POST EXERCISE DUR MIN: 5 MIN
STRESS POST EXERCISE DUR SEC: 25 SEC
STRESS POST PEAK BP: NORMAL MMHG
STRESS POST PEAK HR: 136 BPM
STRESS TARGET HR: 117 BPM
TRIGL SERPL-MCNC: 51 MG/DL (ref 0–149)
TSH SERPL DL<=0.05 MIU/L-ACNC: 2.47 MIU/ML (ref 0.47–4.68)
WBC NRBC COR # BLD: 9.95 10*3/MM3 (ref 4.8–10.8)

## 2017-01-15 PROCEDURE — 85025 COMPLETE CBC W/AUTO DIFF WBC: CPT | Performed by: FAMILY MEDICINE

## 2017-01-15 PROCEDURE — 93018 CV STRESS TEST I&R ONLY: CPT | Performed by: INTERNAL MEDICINE

## 2017-01-15 PROCEDURE — G0378 HOSPITAL OBSERVATION PER HR: HCPCS

## 2017-01-15 PROCEDURE — 93352 ADMIN ECG CONTRAST AGENT: CPT | Performed by: INTERNAL MEDICINE

## 2017-01-15 PROCEDURE — 25010000003 DOBUTAMINE PER 250 MG: Performed by: INTERNAL MEDICINE

## 2017-01-15 PROCEDURE — 93017 CV STRESS TEST TRACING ONLY: CPT

## 2017-01-15 PROCEDURE — 93306 TTE W/DOPPLER COMPLETE: CPT

## 2017-01-15 PROCEDURE — 93351 STRESS TTE COMPLETE: CPT

## 2017-01-15 PROCEDURE — 96375 TX/PRO/DX INJ NEW DRUG ADDON: CPT

## 2017-01-15 PROCEDURE — 84443 ASSAY THYROID STIM HORMONE: CPT | Performed by: FAMILY MEDICINE

## 2017-01-15 PROCEDURE — 93350 STRESS TTE ONLY: CPT

## 2017-01-15 PROCEDURE — 25010000002 CEFTRIAXONE: Performed by: FAMILY MEDICINE

## 2017-01-15 PROCEDURE — 25010000002 PERFLUTREN (DEFINITY) 8.476 MG IN SODIUM CHLORIDE 10 ML INJECTION: Performed by: INTERNAL MEDICINE

## 2017-01-15 PROCEDURE — 80053 COMPREHEN METABOLIC PANEL: CPT | Performed by: FAMILY MEDICINE

## 2017-01-15 PROCEDURE — 93350 STRESS TTE ONLY: CPT | Performed by: INTERNAL MEDICINE

## 2017-01-15 PROCEDURE — 93306 TTE W/DOPPLER COMPLETE: CPT | Performed by: INTERNAL MEDICINE

## 2017-01-15 PROCEDURE — 80061 LIPID PANEL: CPT | Performed by: FAMILY MEDICINE

## 2017-01-15 RX ORDER — PANTOPRAZOLE SODIUM 40 MG/1
40 TABLET, DELAYED RELEASE ORAL DAILY
Qty: 30 TABLET | Refills: 2 | Status: SHIPPED | OUTPATIENT
Start: 2017-01-15 | End: 2017-02-01 | Stop reason: ALTCHOICE

## 2017-01-15 RX ORDER — CEFDINIR 300 MG/1
300 CAPSULE ORAL 2 TIMES DAILY
Qty: 20 CAPSULE | Refills: 20 | Status: SHIPPED | OUTPATIENT
Start: 2017-01-15 | End: 2017-02-01 | Stop reason: ALTCHOICE

## 2017-01-15 RX ORDER — ACETAMINOPHEN 325 MG/1
650 TABLET ORAL EVERY 6 HOURS PRN
Status: DISCONTINUED | OUTPATIENT
Start: 2017-01-15 | End: 2017-01-15 | Stop reason: HOSPADM

## 2017-01-15 RX ORDER — DOBUTAMINE HYDROCHLORIDE 100 MG/100ML
10-50 INJECTION INTRAVENOUS
Status: DISCONTINUED | OUTPATIENT
Start: 2017-01-15 | End: 2017-01-15 | Stop reason: HOSPADM

## 2017-01-15 RX ADMIN — SODIUM CHLORIDE 5 ML: 9 INJECTION INTRAMUSCULAR; INTRAVENOUS; SUBCUTANEOUS at 10:33

## 2017-01-15 RX ADMIN — CEFTRIAXONE 1 G: 1 INJECTION, POWDER, FOR SOLUTION INTRAMUSCULAR; INTRAVENOUS at 14:47

## 2017-01-15 RX ADMIN — SODIUM CHLORIDE 5 ML: 9 INJECTION INTRAMUSCULAR; INTRAVENOUS; SUBCUTANEOUS at 10:18

## 2017-01-15 RX ADMIN — RIVAROXABAN 20 MG: 20 TABLET, FILM COATED ORAL at 08:26

## 2017-01-15 RX ADMIN — PANTOPRAZOLE SODIUM 40 MG: 40 INJECTION, POWDER, FOR SOLUTION INTRAVENOUS at 06:27

## 2017-01-15 RX ADMIN — DOBUTAMINE HYDROCHLORIDE 10 MCG/KG/MIN: 100 INJECTION INTRAVENOUS at 10:18

## 2017-01-15 NOTE — DISCHARGE SUMMARY
"    [facility]  DISCHARGE SUMMARY       Date of Admission: 1/14/2017  Date of Discharge:  1/15/2017  Primary Care Physician: Zia Benitez MD    Presenting Problem/History of Present Illness:  Other chest pain [R07.89]     Final Discharge Diagnoses:  Hospital Problem List     Chest pain    Sinusitis    Fibrillation, atrial    Reflux esophagitis        Pertinent Test Results:   Stress Echo Findings      Ventricle Size / Description  Segment augmentation had a normal response to stress. Cavity size behaved normally in response to stress.     Stress Echo - Peak Stress Findings  Post stress images with adequate visualization of myocardium to assess for ischemia. Normal stress echo with no significant echocardiographic evidence for myocardial ischemia.         Echocardiogram Findings      Left Ventricle  Calculated EF = 59.5%.           Chief Complaint on Day of Discharge: none    History of Present Illness on Day of Discharge:  Patient is an 82-year-old  male is present ED with family. Both are vague and difficult historians. From what I can gather, the patient experienced dizziness 2 weeks ago, left-sided headache, fell, and impacted the left side of his head/left side of face. He believes his upper respiratory congestion began than as well. The patient would see his primary care physician about this. He was given an injection of steriods, started on some sort of antibiotic (name unknown) and had a CT scan of his head ordered. The patient completed a CT scan of his head as an outpatient procedure at Dr. Borja's office. His family reports that he had sinus infections found on CT. Because his dizziness and congestion still persisted, his family reports a second antibiotic was prescribed. The patient has been coughing as well with \"the nastiest phlegm I have ever seen.\" He denies any obvious blood. His symptoms worsened. He complains of some blurry " "vision. He denies visual loss. Family thought she saw redness to his left eye but deny any pupil abnormality that she is aware of.   When he began to vomit today and started to complain of a funny feeling in his chest, his family brought him to the ER further evaluation.    Hospital Course:  The patient is a 82 y.o. male who presented to [facility] with chest pain.  Patient has been asymptomatic during the course of admission.  Currently denies any chest pain and shortness of breath.  Chronic enzyme was negative.  Stress echo no ischemia.  Echocardiogram shows ejection fraction 59%.  Wife request for follow-up with HEENT specialist for chronic sinusitis.  Patient follow-up with his primary care doctor for evaluation of officia reading of echocardiogram reading.    Condition on Discharge: stable    Physical Exam on Discharge:  Visit Vitals   • /80 (BP Location: Left arm, Patient Position: Sitting)   • Pulse 104   • Temp 98.2 °F (36.8 °C) (Oral)   • Resp 18   • Ht 68\" (172.7 cm)   • Wt 147 lb 1 oz (66.7 kg)   • SpO2 100%   • BMI 22.36 kg/m2     Physical Exam   Constitutional: He is oriented to person, place, and time. He appears well-developed and well-nourished.   HENT:   Head: Normocephalic and atraumatic.   Eyes: Conjunctivae and EOM are normal. Pupils are equal, round, and reactive to light.   Neck: Neck supple. No JVD present. No thyromegaly present.   Cardiovascular: Normal rate, regular rhythm, normal heart sounds and intact distal pulses.  Exam reveals no gallop and no friction rub.    No murmur heard.  Pulmonary/Chest: Effort normal and breath sounds normal. No respiratory distress. He has no wheezes. He has no rales. He exhibits no tenderness.   Abdominal: Soft. Bowel sounds are normal. He exhibits no distension. There is no tenderness. There is no rebound and no guarding.   Musculoskeletal: Normal range of motion. He exhibits no edema, tenderness or deformity.   Lymphadenopathy:     He has " no cervical adenopathy.   Neurological: He is alert and oriented to person, place, and time. He displays normal reflexes. No cranial nerve deficit. He exhibits normal muscle tone.   Skin: Skin is warm and dry. No rash noted.   Psychiatric: He has a normal mood and affect. His behavior is normal. Judgment and thought content normal.   Nursing note and vitals reviewed.        Discharge Disposition:  Home or Self Care    Discharge Medications:   Alejandro Gerber   Home Medication Instructions NATE:[id number]    Printed on:01/15/17 7513   Medication Information                      atorvastatin (LIPITOR) 10 MG tablet  Take  by mouth Daily.             cefdinir (OMNICEF) 300 MG capsule  Take 1 capsule by mouth 2 (Two) Times a Day.             ondansetron ODT (ZOFRAN-ODT) 4 MG disintegrating tablet  Take 1 tablet by mouth Every 8 (Eight) Hours As Needed for nausea or vomiting.             pantoprazole (PROTONIX) 40 MG EC tablet  Take 1 tablet by mouth Daily.             rivaroxaban (XARELTO) 20 MG tablet  Take 20 mg by mouth Daily.                 Discharge Diet:  advance as tolerated    Activity at Discharge:  ad li    Discharge Care Plan/Instructions:     Follow-up Appointments:   We try to make him in for patient to see in HEENT specialist as an outpatient for chronic sinusitis.  Patient to follow with his PCP in 1 week.    Higinio Kevin MD  01/15/17  3:49 PM    Time: Greater than 30 minutes    Please note that portions of this note may have been completed with a voice recognition program. Efforts were made to edit the dictations, but occasionally words are mistranscribed.

## 2017-01-15 NOTE — PLAN OF CARE
Problem: Patient Care Overview (Adult)  Goal: Plan of Care Review  Outcome: Ongoing (interventions implemented as appropriate)    01/15/17 0314   Coping/Psychosocial Response Interventions   Plan Of Care Reviewed With patient   Patient Care Overview   Progress no change   Outcome Evaluation   Outcome Summary/Follow up Plan No c/o pain this shift. VSS, sinus on telemetry. Strress test this am.         Problem: Acute Coronary Syndrome (ACS) (Adult)  Goal: Signs and Symptoms of Listed Potential Problems Will be Absent or Manageable (Acute Coronary Syndrome)  Outcome: Ongoing (interventions implemented as appropriate)    01/15/17 0314   Acute Coronary Syndrome (ACS)   Problems Assessed (Acute Coronary Syndrome (ACS)) all   Problems Present (Acute Coronary Syndrome (ACS)) none

## 2017-01-19 LAB — BACTERIA SPEC AEROBE CULT: NORMAL

## 2017-02-01 ENCOUNTER — OFFICE VISIT (OUTPATIENT)
Dept: OTOLARYNGOLOGY | Facility: CLINIC | Age: 82
End: 2017-02-01

## 2017-02-01 ENCOUNTER — PROCEDURE VISIT (OUTPATIENT)
Dept: OTOLARYNGOLOGY | Facility: CLINIC | Age: 82
End: 2017-02-01

## 2017-02-01 VITALS
HEIGHT: 68 IN | WEIGHT: 145.38 LBS | HEART RATE: 89 BPM | SYSTOLIC BLOOD PRESSURE: 131 MMHG | TEMPERATURE: 99.3 F | DIASTOLIC BLOOD PRESSURE: 95 MMHG | BODY MASS INDEX: 22.03 KG/M2

## 2017-02-01 DIAGNOSIS — J32.9 CHRONIC SINUSITIS, UNSPECIFIED LOCATION: ICD-10-CM

## 2017-02-01 DIAGNOSIS — R51.9 HEADACHE, UNSPECIFIED HEADACHE TYPE: ICD-10-CM

## 2017-02-01 DIAGNOSIS — J30.9 ALLERGIC RHINITIS, UNSPECIFIED ALLERGIC RHINITIS TRIGGER, UNSPECIFIED RHINITIS SEASONALITY: ICD-10-CM

## 2017-02-01 DIAGNOSIS — H69.83 ETD (EUSTACHIAN TUBE DYSFUNCTION), BILATERAL: ICD-10-CM

## 2017-02-01 DIAGNOSIS — J34.3 HYPERTROPHY OF INFERIOR NASAL TURBINATE: ICD-10-CM

## 2017-02-01 DIAGNOSIS — H91.93 HEARING LOSS, BILATERAL: ICD-10-CM

## 2017-02-01 DIAGNOSIS — Z79.01 CHRONIC ANTICOAGULATION: ICD-10-CM

## 2017-02-01 DIAGNOSIS — J01.90 ACUTE SINUSITIS, RECURRENCE NOT SPECIFIED, UNSPECIFIED LOCATION: Primary | ICD-10-CM

## 2017-02-01 DIAGNOSIS — R42 DIZZINESS: Primary | ICD-10-CM

## 2017-02-01 DIAGNOSIS — I48.91 ATRIAL FIBRILLATION, UNSPECIFIED TYPE (HCC): ICD-10-CM

## 2017-02-01 PROCEDURE — 99203 OFFICE O/P NEW LOW 30 MIN: CPT | Performed by: NURSE PRACTITIONER

## 2017-02-01 RX ORDER — ATORVASTATIN CALCIUM 40 MG/1
80 TABLET, FILM COATED ORAL
COMMUNITY
Start: 2016-12-27 | End: 2017-05-24 | Stop reason: SDUPTHER

## 2017-02-01 RX ORDER — FLUTICASONE PROPIONATE 50 MCG
2 SPRAY, SUSPENSION (ML) NASAL DAILY
Qty: 16 G | Refills: 5 | Status: SHIPPED | OUTPATIENT
Start: 2017-02-01 | End: 2017-03-03

## 2017-02-01 RX ORDER — AZELASTINE 1 MG/ML
2 SPRAY, METERED NASAL 2 TIMES DAILY
Qty: 30 ML | Refills: 5 | Status: SHIPPED | OUTPATIENT
Start: 2017-02-01 | End: 2017-04-21

## 2017-02-01 RX ORDER — MECLIZINE HYDROCHLORIDE 25 MG/1
TABLET ORAL
COMMUNITY
Start: 2017-01-16 | End: 2017-04-21

## 2017-02-01 RX ORDER — DILTIAZEM HYDROCHLORIDE 120 MG/1
120 CAPSULE, COATED, EXTENDED RELEASE ORAL DAILY
COMMUNITY
Start: 2016-12-27 | End: 2018-01-01 | Stop reason: HOSPADM

## 2017-02-01 RX ORDER — AMOXICILLIN AND CLAVULANATE POTASSIUM 875; 125 MG/1; MG/1
1 TABLET, FILM COATED ORAL 2 TIMES DAILY
Qty: 20 TABLET | Refills: 0 | Status: SHIPPED | OUTPATIENT
Start: 2017-02-01 | End: 2017-02-11

## 2017-02-01 NOTE — PROGRESS NOTES
YOB: 1934  Location: [facility]  Location Address: [address] [place] [address], [facility], [address] [place] [address]  Location Phone: [phone]    Chief Complaint   Patient presents with   • Dizziness       History of Present Illness  Alejandro Gerber is a 82 y.o. male.  Alejandro Gerber is here for evaluation of ENT complaints. The patient has had problems with sinusitis and sinus pressure  The symptoms are not localized to a particular location. The patient has had moderate symptoms. The symptoms have been present for the last several weeks . The symptoms are aggravated by  no identifiable factors . The symptoms are improved by no identifieable factors. He has been hospitalized for a sinus infection, headaches, URI and recently discharged.  CT HEAD images reviewed with obvious right frontal and ethmoid infection.  He reports hearing loss and stopped up ears with history of left side cochlear implant.        Past Medical History   Diagnosis Date   • Asthma    • Atrial fibrillation    • Coronary artery disease    • Hard of hearing    • Hyperlipidemia    • Prostate cancer    • Shingles      2 years ago   • Stroke        Past Surgical History   Procedure Laterality Date   • Cardiac surgery     • Cochlear implant revision       insertion   • Prostate surgery           Current Outpatient Prescriptions:   •  atorvastatin (LIPITOR) 40 MG tablet, , Disp: , Rfl:   •  diltiaZEM CD (CARDIZEM CD) 120 MG 24 hr capsule, , Disp: , Rfl:   •  meclizine (ANTIVERT) 25 MG tablet, , Disp: , Rfl:   •  ondansetron ODT (ZOFRAN-ODT) 4 MG disintegrating tablet, Take 1 tablet by mouth Every 8 (Eight) Hours As Needed for nausea or vomiting., Disp: 15 tablet, Rfl: 0  •  rivaroxaban (XARELTO) 20 MG tablet, Take 20 mg by mouth Daily., Disp: , Rfl:   •  amoxicillin-clavulanate (AUGMENTIN) 875-125 MG per tablet, Take 1 tablet by mouth 2 (Two) Times a Day for 10 days., Disp: 20 tablet, Rfl: 0  •  azelastine (ASTELIN) 0.1 % nasal spray, 2 sprays  into each nostril 2 (Two) Times a Day. Use in each nostril as directed, Disp: 30 mL, Rfl: 5  •  fluticasone (FLONASE) 50 MCG/ACT nasal spray, 2 sprays into each nostril Daily for 30 days., Disp: 16 g, Rfl: 5    Review of patient's allergies indicates no known allergies.    Family History   Problem Relation Age of Onset   • No Known Problems Mother    • No Known Problems Father        Social History     Social History   • Marital status:      Spouse name: N/A   • Number of children: N/A   • Years of education: N/A     Occupational History   • Not on file.     Social History Main Topics   • Smoking status: Never Smoker   • Smokeless tobacco: Not on file   • Alcohol use No   • Drug use: No   • Sexual activity: Not on file     Other Topics Concern   • Not on file     Social History Narrative       Review of Systems   Constitutional: Negative.    HENT:        SEE HPI   Eyes: Negative.    Respiratory: Negative.    Cardiovascular: Negative.    Gastrointestinal: Negative.    Endocrine: Negative.    Genitourinary: Negative.    Musculoskeletal: Negative.    Skin: Negative.    Allergic/Immunologic: Negative.    Neurological: Negative.    Hematological: Negative.    Psychiatric/Behavioral: Negative.        Vitals:    02/01/17 1124   BP: 131/95   Pulse: 89   Temp: 99.3 °F (37.4 °C)       Objective     Physical Exam  CONSTITUTIONAL: well nourished, alert, oriented, in no acute distress     COMMUNICATION AND VOICE: able to communicate normally, normal voice quality    HEAD: normocephalic, no lesions, atraumatic, no tenderness, no masses     FACE: appearance normal, no lesions, no tenderness, no deformities, facial motion symmetric    SALIVARY GLANDS: parotid glands with no tenderness, no swelling, no masses, submandibular glands with normal size, nontender    EYES: ocular motility normal, eyelids normal, orbits normal, no proptosis, conjunctiva normal , pupils equal, round     EARS:  Hearing: response to conversational  voice severely impaired  External Ears: auricles without lesions  Otoscopic: tympanic membranes with tympanosclerosis, no lesions, no perforation, normal mobility, no fluid    NOSE:  External Nose: structure normal, no tenderness on palpation, no nasal discharge, no lesions, no evidence of trauma, nostrils patent   Intranasal Exam: nasal mucosa edema, vestibule within normal limits, inferior turbinate hypertrophy, nasal septum midline     ORAL:  Lips: upper and lower lips without lesion   Teeth: dentition within normal limits for age   Gums: gingivae healthy   Oral Mucosa: oral mucosa normal, no mucosal lesions   Floor of Mouth: Warthin’s duct patent, mucosa normal  Tongue: lingual mucosa normal without lesions, normal tongue mobility   Palate: soft and hard palates with normal mucosa and structure  Oropharynx: oropharyngeal mucosa normal    NECK: neck appearance normal, no mass,  noted without erythema or tenderness    THYROID: no overt thyromegaly, no tenderness, nodules or mass present on palpation, position midline     LYMPH NODES: no lymphadenopathy    CHEST/RESPIRATORY: respiratory effort normal  CARDIOVASCULAR: extremities without cyanosis or edema      NEUROLOGIC/PSYCHIATRIC: oriented to time, place and person, mood normal, affect appropriate, CN II-XII intact grossly    Assessment/Plan   Alejandro was seen today for dizziness.    Diagnoses and all orders for this visit:    Acute sinusitis, recurrence not specified, unspecified location  -     CT Sinus Without Contrast; Future    Chronic sinusitis, unspecified location  -     CT Sinus Without Contrast; Future    Allergic rhinitis, unspecified allergic rhinitis trigger, unspecified rhinitis seasonality    Headache, unspecified headache type    Hypertrophy of inferior nasal turbinate    Atrial fibrillation, unspecified type    Chronic anticoagulation    ETD (eustachian tube dysfunction), bilateral    Hearing loss, bilateral    Other orders  -      amoxicillin-clavulanate (AUGMENTIN) 875-125 MG per tablet; Take 1 tablet by mouth 2 (Two) Times a Day for 10 days.  -     fluticasone (FLONASE) 50 MCG/ACT nasal spray; 2 sprays into each nostril Daily for 30 days.  -     azelastine (ASTELIN) 0.1 % nasal spray; 2 sprays into each nostril 2 (Two) Times a Day. Use in each nostril as directed      * Surgery not found *  Orders Placed This Encounter   Procedures   • CT Sinus Without Contrast     Standing Status:   Future     Standing Expiration Date:   2/1/2018     Order Specific Question:   Reason for Exam:     Answer:   persistent sinusitis     Return in about 3 weeks (around 2/22/2017).       Patient Instructions   Call for problems or worsening symptoms including worsening headaches, fever, mental status changes

## 2017-02-02 NOTE — PATIENT INSTRUCTIONS
Call for problems or worsening symptoms including worsening headaches, fever, mental status changes

## 2017-03-20 ENCOUNTER — TRANSCRIBE ORDERS (OUTPATIENT)
Dept: ADMINISTRATIVE | Facility: HOSPITAL | Age: 82
End: 2017-03-20

## 2017-03-20 DIAGNOSIS — R01.1 CARDIAC MURMUR: Primary | ICD-10-CM

## 2017-03-21 ENCOUNTER — HOSPITAL ENCOUNTER (OUTPATIENT)
Dept: CARDIOLOGY | Facility: HOSPITAL | Age: 82
Discharge: HOME | End: 2017-03-21
Admitting: PHYSICIAN ASSISTANT

## 2017-03-21 VITALS
SYSTOLIC BLOOD PRESSURE: 146 MMHG | DIASTOLIC BLOOD PRESSURE: 96 MMHG | HEIGHT: 66 IN | WEIGHT: 147 LBS | BODY MASS INDEX: 23.63 KG/M2

## 2017-03-21 DIAGNOSIS — R01.1 CARDIAC MURMUR: ICD-10-CM

## 2017-03-21 LAB
BH CV ECHO MEAS - AI DEC SLOPE: 367.3 CM/SEC^2
BH CV ECHO MEAS - AI MAX PG: 74.6 MMHG
BH CV ECHO MEAS - AI MAX VEL: 432 CM/SEC
BH CV ECHO MEAS - AI P1/2T: 344.5 MSEC
BH CV ECHO MEAS - AO MAX PG (FULL): 36.3 MMHG
BH CV ECHO MEAS - AO MAX PG: 38.4 MMHG
BH CV ECHO MEAS - AO MEAN PG (FULL): 22 MMHG
BH CV ECHO MEAS - AO MEAN PG: 23 MMHG
BH CV ECHO MEAS - AO ROOT AREA (BSA CORRECTED): 2
BH CV ECHO MEAS - AO ROOT AREA: 9.6 CM^2
BH CV ECHO MEAS - AO ROOT DIAM: 3.5 CM
BH CV ECHO MEAS - AO V2 MAX: 310 CM/SEC
BH CV ECHO MEAS - AO V2 MEAN: 231 CM/SEC
BH CV ECHO MEAS - AO V2 VTI: 72 CM
BH CV ECHO MEAS - AVA(I,A): 0.82 CM^2
BH CV ECHO MEAS - AVA(V,A): 0.81 CM^2
BH CV ECHO MEAS - AVA(V,D): 0.81 CM^2
BH CV ECHO MEAS - BSA(HAYCOCK): 1.8 M^2
BH CV ECHO MEAS - BSA: 1.8 M^2
BH CV ECHO MEAS - BZI_BMI: 23.7 KILOGRAMS/M^2
BH CV ECHO MEAS - BZI_METRIC_HEIGHT: 167.6 CM
BH CV ECHO MEAS - BZI_METRIC_WEIGHT: 66.7 KG
BH CV ECHO MEAS - CONTRAST EF 4CH: 48 ML/M^2
BH CV ECHO MEAS - EDV(CUBED): 41.8 ML
BH CV ECHO MEAS - EDV(MOD-SP4): 36.9 ML
BH CV ECHO MEAS - EDV(TEICH): 49.8 ML
BH CV ECHO MEAS - EF(CUBED): 58.9 %
BH CV ECHO MEAS - EF(TEICH): 51.6 %
BH CV ECHO MEAS - ESV(CUBED): 17.2 ML
BH CV ECHO MEAS - ESV(MOD-SP4): 19.2 ML
BH CV ECHO MEAS - ESV(TEICH): 24.1 ML
BH CV ECHO MEAS - FS: 25.6 %
BH CV ECHO MEAS - IVS/LVPW: 1
BH CV ECHO MEAS - IVSD: 1.5 CM
BH CV ECHO MEAS - LA DIMENSION: 4.7 CM
BH CV ECHO MEAS - LA/AO: 1.3
BH CV ECHO MEAS - LV DIASTOLIC VOL/BSA (35-75): 21 ML/M^2
BH CV ECHO MEAS - LV MASS(C)D: 192.2 GRAMS
BH CV ECHO MEAS - LV MASS(C)DI: 109.5 GRAMS/M^2
BH CV ECHO MEAS - LV MAX PG: 2.1 MMHG
BH CV ECHO MEAS - LV MEAN PG: 1 MMHG
BH CV ECHO MEAS - LV SYSTOLIC VOL/BSA (12-30): 10.9 ML/M^2
BH CV ECHO MEAS - LV V1 MAX: 72.6 CM/SEC
BH CV ECHO MEAS - LV V1 MEAN: 54.9 CM/SEC
BH CV ECHO MEAS - LV V1 VTI: 17.1 CM
BH CV ECHO MEAS - LVIDD: 3.5 CM
BH CV ECHO MEAS - LVIDS: 2.6 CM
BH CV ECHO MEAS - LVLD AP4: 6.8 CM
BH CV ECHO MEAS - LVLS AP4: 6.9 CM
BH CV ECHO MEAS - LVOT AREA (M): 3.5 CM^2
BH CV ECHO MEAS - LVOT AREA: 3.5 CM^2
BH CV ECHO MEAS - LVOT DIAM: 2.1 CM
BH CV ECHO MEAS - LVPWD: 1.5 CM
BH CV ECHO MEAS - MR ALIAS VEL: 30.8 CM/SEC
BH CV ECHO MEAS - MR ERO: 0.1 CM^2
BH CV ECHO MEAS - MR FLOW RATE: 48.4 CM^3/SEC
BH CV ECHO MEAS - MR MAX PG: 89.1 MMHG
BH CV ECHO MEAS - MR MAX VEL: 472 CM/SEC
BH CV ECHO MEAS - MR MEAN PG: 62 MMHG
BH CV ECHO MEAS - MR MEAN VEL: 375 CM/SEC
BH CV ECHO MEAS - MR PISA RADIUS: 0.5 CM
BH CV ECHO MEAS - MR PISA: 1.6 CM^2
BH CV ECHO MEAS - MR VOLUME: 18.7 ML
BH CV ECHO MEAS - MR VTI: 182 CM
BH CV ECHO MEAS - MV DEC TIME: 0.24 SEC
BH CV ECHO MEAS - MV E MAX VEL: 77.1 CM/SEC
BH CV ECHO MEAS - RAP SYSTOLE: 10 MMHG
BH CV ECHO MEAS - RVSP: 36 MMHG
BH CV ECHO MEAS - SI(AO): 394.8 ML/M^2
BH CV ECHO MEAS - SI(CUBED): 14 ML/M^2
BH CV ECHO MEAS - SI(LVOT): 33.8 ML/M^2
BH CV ECHO MEAS - SI(MOD-SP4): 10.1 ML/M^2
BH CV ECHO MEAS - SI(TEICH): 14.6 ML/M^2
BH CV ECHO MEAS - SV(AO): 692.7 ML
BH CV ECHO MEAS - SV(CUBED): 24.6 ML
BH CV ECHO MEAS - SV(LVOT): 59.2 ML
BH CV ECHO MEAS - SV(MOD-SP4): 17.7 ML
BH CV ECHO MEAS - SV(TEICH): 25.7 ML
BH CV ECHO MEAS - TR MAX VEL: 255 CM/SEC
E/E' RATIO: 13.4
LEFT ATRIUM VOLUME INDEX: 46 ML/M2
LEFT ATRIUM VOLUME: 80.5 CM3
LV EF 2D ECHO EST: 50 %

## 2017-03-21 PROCEDURE — 93306 TTE W/DOPPLER COMPLETE: CPT

## 2017-03-21 PROCEDURE — 93306 TTE W/DOPPLER COMPLETE: CPT | Performed by: INTERNAL MEDICINE

## 2017-04-17 ENCOUNTER — HOSPITAL ENCOUNTER (OUTPATIENT)
Facility: HOSPITAL | Age: 82
Setting detail: OBSERVATION
Discharge: HOME | End: 2017-04-18
Attending: EMERGENCY MEDICINE | Admitting: INTERNAL MEDICINE

## 2017-04-17 ENCOUNTER — APPOINTMENT (OUTPATIENT)
Dept: GENERAL RADIOLOGY | Facility: HOSPITAL | Age: 82
End: 2017-04-17

## 2017-04-17 ENCOUNTER — APPOINTMENT (OUTPATIENT)
Dept: CT IMAGING | Facility: HOSPITAL | Age: 82
End: 2017-04-17

## 2017-04-17 DIAGNOSIS — R79.9 ELEVATED BUN: ICD-10-CM

## 2017-04-17 DIAGNOSIS — J39.2 ENT DISEASE: ICD-10-CM

## 2017-04-17 DIAGNOSIS — J34.9 ENT DISEASE: ICD-10-CM

## 2017-04-17 DIAGNOSIS — R93.0 ABNORMAL CT SCAN OF HEAD: ICD-10-CM

## 2017-04-17 DIAGNOSIS — H93.90 ENT DISEASE: ICD-10-CM

## 2017-04-17 DIAGNOSIS — G45.9 TRANSIENT CEREBRAL ISCHEMIA, UNSPECIFIED TYPE: Primary | ICD-10-CM

## 2017-04-17 DIAGNOSIS — I48.20 CHRONIC ATRIAL FIBRILLATION (HCC): ICD-10-CM

## 2017-04-17 LAB
ALBUMIN SERPL-MCNC: 4 G/DL (ref 3.5–5)
ALBUMIN/GLOB SERPL: 1.1 G/DL (ref 1.1–2.5)
ALP SERPL-CCNC: 101 U/L (ref 24–120)
ALT SERPL W P-5'-P-CCNC: 16 U/L (ref 0–54)
ANION GAP SERPL CALCULATED.3IONS-SCNC: 13 MMOL/L (ref 4–13)
APTT PPP: 51 SECONDS (ref 24.1–34.8)
AST SERPL-CCNC: 35 U/L (ref 7–45)
BASOPHILS # BLD AUTO: 0.05 10*3/MM3 (ref 0–0.2)
BASOPHILS NFR BLD AUTO: 0.5 % (ref 0–2)
BILIRUB SERPL-MCNC: 0.7 MG/DL (ref 0.1–1)
BUN BLD-MCNC: 44 MG/DL (ref 5–21)
BUN/CREAT SERPL: 35.8 (ref 7–25)
CALCIUM SPEC-SCNC: 9.3 MG/DL (ref 8.4–10.4)
CHLORIDE SERPL-SCNC: 101 MMOL/L (ref 98–110)
CO2 SERPL-SCNC: 26 MMOL/L (ref 24–31)
CREAT BLD-MCNC: 1.23 MG/DL (ref 0.5–1.4)
DEPRECATED RDW RBC AUTO: 45.6 FL (ref 40–54)
EOSINOPHIL # BLD AUTO: 0.16 10*3/MM3 (ref 0–0.7)
EOSINOPHIL NFR BLD AUTO: 1.5 % (ref 0–4)
ERYTHROCYTE [DISTWIDTH] IN BLOOD BY AUTOMATED COUNT: 14.6 % (ref 12–15)
GFR SERPL CREATININE-BSD FRML MDRD: 56 ML/MIN/1.73
GLOBULIN UR ELPH-MCNC: 3.7 GM/DL
GLUCOSE BLD-MCNC: 97 MG/DL (ref 70–100)
HCT VFR BLD AUTO: 38.6 % (ref 40–52)
HGB BLD-MCNC: 12.8 G/DL (ref 14–18)
IMM GRANULOCYTES # BLD: 0.04 10*3/MM3 (ref 0–0.03)
IMM GRANULOCYTES NFR BLD: 0.4 % (ref 0–5)
INR PPP: 2.58 (ref 0.91–1.09)
LYMPHOCYTES # BLD AUTO: 2.38 10*3/MM3 (ref 0.72–4.86)
LYMPHOCYTES NFR BLD AUTO: 22.5 % (ref 15–45)
MAGNESIUM SERPL-MCNC: 2.2 MG/DL (ref 1.4–2.2)
MCH RBC QN AUTO: 28.7 PG (ref 28–32)
MCHC RBC AUTO-ENTMCNC: 33.2 G/DL (ref 33–36)
MCV RBC AUTO: 86.5 FL (ref 82–95)
MONOCYTES # BLD AUTO: 0.89 10*3/MM3 (ref 0.19–1.3)
MONOCYTES NFR BLD AUTO: 8.4 % (ref 4–12)
NEUTROPHILS # BLD AUTO: 7.06 10*3/MM3 (ref 1.87–8.4)
NEUTROPHILS NFR BLD AUTO: 66.7 % (ref 39–78)
NT-PROBNP SERPL-MCNC: 1030 PG/ML (ref 0–1800)
PLATELET # BLD AUTO: 221 10*3/MM3 (ref 130–400)
PMV BLD AUTO: 9.8 FL (ref 6–12)
POTASSIUM BLD-SCNC: 4.7 MMOL/L (ref 3.5–5.3)
PROT SERPL-MCNC: 7.7 G/DL (ref 6.3–8.7)
PROTHROMBIN TIME: 28.6 SECONDS (ref 11.9–14.6)
RBC # BLD AUTO: 4.46 10*6/MM3 (ref 4.8–5.9)
SODIUM BLD-SCNC: 140 MMOL/L (ref 135–145)
TROPONIN I SERPL-MCNC: 0.01 NG/ML (ref 0–0.07)
TROPONIN I SERPL-MCNC: 0.02 NG/ML (ref 0–0.03)
WBC NRBC COR # BLD: 10.58 10*3/MM3 (ref 4.8–10.8)

## 2017-04-17 PROCEDURE — 70450 CT HEAD/BRAIN W/O DYE: CPT

## 2017-04-17 PROCEDURE — 85610 PROTHROMBIN TIME: CPT | Performed by: EMERGENCY MEDICINE

## 2017-04-17 PROCEDURE — 99285 EMERGENCY DEPT VISIT HI MDM: CPT

## 2017-04-17 PROCEDURE — G0378 HOSPITAL OBSERVATION PER HR: HCPCS

## 2017-04-17 PROCEDURE — 94799 UNLISTED PULMONARY SVC/PX: CPT

## 2017-04-17 PROCEDURE — 83880 ASSAY OF NATRIURETIC PEPTIDE: CPT | Performed by: EMERGENCY MEDICINE

## 2017-04-17 PROCEDURE — 36415 COLL VENOUS BLD VENIPUNCTURE: CPT

## 2017-04-17 PROCEDURE — 85025 COMPLETE CBC W/AUTO DIFF WBC: CPT | Performed by: EMERGENCY MEDICINE

## 2017-04-17 PROCEDURE — 80053 COMPREHEN METABOLIC PANEL: CPT | Performed by: EMERGENCY MEDICINE

## 2017-04-17 PROCEDURE — 85730 THROMBOPLASTIN TIME PARTIAL: CPT | Performed by: EMERGENCY MEDICINE

## 2017-04-17 PROCEDURE — 84484 ASSAY OF TROPONIN QUANT: CPT

## 2017-04-17 PROCEDURE — 93010 ELECTROCARDIOGRAM REPORT: CPT | Performed by: INTERNAL MEDICINE

## 2017-04-17 PROCEDURE — 71010 HC CHEST PA OR AP: CPT

## 2017-04-17 PROCEDURE — 93005 ELECTROCARDIOGRAM TRACING: CPT | Performed by: EMERGENCY MEDICINE

## 2017-04-17 PROCEDURE — 83735 ASSAY OF MAGNESIUM: CPT | Performed by: EMERGENCY MEDICINE

## 2017-04-17 PROCEDURE — 84484 ASSAY OF TROPONIN QUANT: CPT | Performed by: EMERGENCY MEDICINE

## 2017-04-17 RX ORDER — IPRATROPIUM BROMIDE AND ALBUTEROL SULFATE 2.5; .5 MG/3ML; MG/3ML
3 SOLUTION RESPIRATORY (INHALATION) EVERY 4 HOURS PRN
Status: DISCONTINUED | OUTPATIENT
Start: 2017-04-17 | End: 2017-04-18 | Stop reason: HOSPADM

## 2017-04-17 RX ORDER — PANTOPRAZOLE SODIUM 40 MG/1
40 TABLET, DELAYED RELEASE ORAL
Status: DISCONTINUED | OUTPATIENT
Start: 2017-04-18 | End: 2017-04-18 | Stop reason: HOSPADM

## 2017-04-17 RX ORDER — ASPIRIN 81 MG/1
81 TABLET ORAL DAILY
Status: DISCONTINUED | OUTPATIENT
Start: 2017-04-18 | End: 2017-04-18 | Stop reason: HOSPADM

## 2017-04-17 RX ORDER — ATORVASTATIN CALCIUM 40 MG/1
40 TABLET, FILM COATED ORAL DAILY
Status: DISCONTINUED | OUTPATIENT
Start: 2017-04-18 | End: 2017-04-18

## 2017-04-17 RX ORDER — ATORVASTATIN CALCIUM 40 MG/1
40 TABLET, FILM COATED ORAL DAILY
COMMUNITY
End: 2017-04-18 | Stop reason: HOSPADM

## 2017-04-17 RX ORDER — ACETAMINOPHEN 325 MG/1
650 TABLET ORAL EVERY 6 HOURS PRN
Status: DISCONTINUED | OUTPATIENT
Start: 2017-04-17 | End: 2017-04-18 | Stop reason: HOSPADM

## 2017-04-17 RX ORDER — ONDANSETRON 2 MG/ML
4 INJECTION INTRAMUSCULAR; INTRAVENOUS EVERY 6 HOURS PRN
Status: DISCONTINUED | OUTPATIENT
Start: 2017-04-17 | End: 2017-04-18 | Stop reason: HOSPADM

## 2017-04-17 RX ORDER — SODIUM CHLORIDE 0.9 % (FLUSH) 0.9 %
1-10 SYRINGE (ML) INJECTION AS NEEDED
Status: DISCONTINUED | OUTPATIENT
Start: 2017-04-17 | End: 2017-04-18 | Stop reason: HOSPADM

## 2017-04-18 ENCOUNTER — APPOINTMENT (OUTPATIENT)
Dept: ULTRASOUND IMAGING | Facility: HOSPITAL | Age: 82
End: 2017-04-18

## 2017-04-18 ENCOUNTER — APPOINTMENT (OUTPATIENT)
Dept: CARDIOLOGY | Facility: HOSPITAL | Age: 82
End: 2017-04-18

## 2017-04-18 ENCOUNTER — APPOINTMENT (OUTPATIENT)
Dept: MRI IMAGING | Facility: HOSPITAL | Age: 82
End: 2017-04-18

## 2017-04-18 VITALS
SYSTOLIC BLOOD PRESSURE: 113 MMHG | OXYGEN SATURATION: 98 % | HEART RATE: 87 BPM | RESPIRATION RATE: 20 BRPM | BODY MASS INDEX: 24.07 KG/M2 | DIASTOLIC BLOOD PRESSURE: 65 MMHG | TEMPERATURE: 98.1 F | HEIGHT: 65 IN | WEIGHT: 144.5 LBS

## 2017-04-18 LAB
ARTICHOKE IGE QN: 87 MG/DL (ref 0–99)
BH CV ECHO MEAS - AO MAX PG (FULL): 30.9 MMHG
BH CV ECHO MEAS - AO MAX PG: 33.9 MMHG
BH CV ECHO MEAS - AO MEAN PG (FULL): 13 MMHG
BH CV ECHO MEAS - AO MEAN PG: 15 MMHG
BH CV ECHO MEAS - AO ROOT AREA: 13.9 CM^2
BH CV ECHO MEAS - AO ROOT DIAM: 4.2 CM
BH CV ECHO MEAS - AO V2 MAX: 291 CM/SEC
BH CV ECHO MEAS - AO V2 MEAN: 181 CM/SEC
BH CV ECHO MEAS - AO V2 VTI: 57.7 CM
BH CV ECHO MEAS - AVA(I,A): 1.1 CM^2
BH CV ECHO MEAS - AVA(I,D): 1.1 CM^2
BH CV ECHO MEAS - AVA(V,A): 1 CM^2
BH CV ECHO MEAS - AVA(V,D): 1 CM^2
BH CV ECHO MEAS - CONTRAST EF 4CH: 58.8 ML/M^2
BH CV ECHO MEAS - EDV(CUBED): 94.2 ML
BH CV ECHO MEAS - EDV(MOD-SP4): 81.6 ML
BH CV ECHO MEAS - EDV(TEICH): 94.9 ML
BH CV ECHO MEAS - EF(CUBED): 60.4 %
BH CV ECHO MEAS - EF(MOD-SP4): 58.8 %
BH CV ECHO MEAS - EF(TEICH): 52.1 %
BH CV ECHO MEAS - ESV(CUBED): 37.3 ML
BH CV ECHO MEAS - ESV(MOD-SP4): 33.6 ML
BH CV ECHO MEAS - ESV(TEICH): 45.4 ML
BH CV ECHO MEAS - FS: 26.6 %
BH CV ECHO MEAS - IVS/LVPW: 0.95
BH CV ECHO MEAS - IVSD: 1.2 CM
BH CV ECHO MEAS - LA DIMENSION: 4.8 CM
BH CV ECHO MEAS - LA/AO: 1.1
BH CV ECHO MEAS - LAT PEAK E' VEL: 9.5 CM/SEC
BH CV ECHO MEAS - LV MASS(C)D: 204 GRAMS
BH CV ECHO MEAS - LV MAX PG: 3 MMHG
BH CV ECHO MEAS - LV MEAN PG: 2 MMHG
BH CV ECHO MEAS - LV V1 MAX: 86.5 CM/SEC
BH CV ECHO MEAS - LV V1 MEAN: 61.2 CM/SEC
BH CV ECHO MEAS - LV V1 VTI: 19.1 CM
BH CV ECHO MEAS - LVIDD: 4.6 CM
BH CV ECHO MEAS - LVIDS: 3.3 CM
BH CV ECHO MEAS - LVLD AP4: 7.6 CM
BH CV ECHO MEAS - LVLS AP4: 6.5 CM
BH CV ECHO MEAS - LVOT AREA (M): 3.5 CM^2
BH CV ECHO MEAS - LVOT AREA: 3.5 CM^2
BH CV ECHO MEAS - LVOT DIAM: 2.1 CM
BH CV ECHO MEAS - LVPWD: 1.2 CM
BH CV ECHO MEAS - MR MAX PG: 53 MMHG
BH CV ECHO MEAS - MR MAX VEL: 364 CM/SEC
BH CV ECHO MEAS - MR MEAN PG: 37 MMHG
BH CV ECHO MEAS - MR MEAN VEL: 290 CM/SEC
BH CV ECHO MEAS - MR VTI: 123 CM
BH CV ECHO MEAS - MV A MAX VEL: 29.1 CM/SEC
BH CV ECHO MEAS - MV DEC TIME: 0.24 SEC
BH CV ECHO MEAS - MV E MAX VEL: 89.7 CM/SEC
BH CV ECHO MEAS - MV E/A: 3.1
BH CV ECHO MEAS - RAP SYSTOLE: 5 MMHG
BH CV ECHO MEAS - RVSP: 49.1 MMHG
BH CV ECHO MEAS - SV(AO): 799.4 ML
BH CV ECHO MEAS - SV(CUBED): 56.9 ML
BH CV ECHO MEAS - SV(LVOT): 66.2 ML
BH CV ECHO MEAS - SV(MOD-SP4): 48 ML
BH CV ECHO MEAS - SV(TEICH): 49.4 ML
BH CV ECHO MEAS - TR MAX VEL: 332 CM/SEC
CHOLEST SERPL-MCNC: 147 MG/DL (ref 130–200)
E/E' RATIO: 13.7
HDLC SERPL-MCNC: 38 MG/DL
HOLD SPECIMEN: NORMAL
HOLD SPECIMEN: NORMAL
LDLC/HDLC SERPL: 2.47 {RATIO}
LEFT ATRIUM VOLUME: 110 CM3
LV EF 2D ECHO EST: 60 %
TRIGL SERPL-MCNC: 76 MG/DL (ref 0–149)
TSH SERPL DL<=0.05 MIU/L-ACNC: 3.18 MIU/ML (ref 0.47–4.68)
WHOLE BLOOD HOLD SPECIMEN: NORMAL
WHOLE BLOOD HOLD SPECIMEN: NORMAL

## 2017-04-18 PROCEDURE — 80061 LIPID PANEL: CPT | Performed by: INTERNAL MEDICINE

## 2017-04-18 PROCEDURE — 84443 ASSAY THYROID STIM HORMONE: CPT | Performed by: INTERNAL MEDICINE

## 2017-04-18 PROCEDURE — 93306 TTE W/DOPPLER COMPLETE: CPT | Performed by: INTERNAL MEDICINE

## 2017-04-18 PROCEDURE — 99220 PR INITIAL OBSERVATION CARE/DAY 70 MINUTES: CPT | Performed by: PSYCHIATRY & NEUROLOGY

## 2017-04-18 PROCEDURE — 93880 EXTRACRANIAL BILAT STUDY: CPT | Performed by: SURGERY

## 2017-04-18 PROCEDURE — 93880 EXTRACRANIAL BILAT STUDY: CPT

## 2017-04-18 PROCEDURE — G0378 HOSPITAL OBSERVATION PER HR: HCPCS

## 2017-04-18 PROCEDURE — 93306 TTE W/DOPPLER COMPLETE: CPT

## 2017-04-18 RX ORDER — PANTOPRAZOLE SODIUM 40 MG/1
40 TABLET, DELAYED RELEASE ORAL DAILY
Qty: 30 TABLET | Refills: 0 | Status: ON HOLD | OUTPATIENT
Start: 2017-04-18 | End: 2018-01-01

## 2017-04-18 RX ORDER — ASPIRIN 81 MG/1
81 TABLET ORAL DAILY
Start: 2017-04-18 | End: 2018-01-01 | Stop reason: SINTOL

## 2017-04-18 RX ORDER — ATORVASTATIN CALCIUM 40 MG/1
80 TABLET, FILM COATED ORAL DAILY
Status: DISCONTINUED | OUTPATIENT
Start: 2017-04-19 | End: 2017-04-18 | Stop reason: HOSPADM

## 2017-04-18 RX ORDER — ATORVASTATIN CALCIUM 80 MG/1
80 TABLET, FILM COATED ORAL DAILY
Qty: 30 TABLET | Refills: 0 | Status: SHIPPED | OUTPATIENT
Start: 2017-04-19 | End: 2018-01-01 | Stop reason: SINTOL

## 2017-04-18 RX ADMIN — PANTOPRAZOLE SODIUM 40 MG: 40 TABLET, DELAYED RELEASE ORAL at 06:21

## 2017-04-18 RX ADMIN — RIVAROXABAN 20 MG: 20 TABLET, FILM COATED ORAL at 09:28

## 2017-04-18 RX ADMIN — ASPIRIN 81 MG: 81 TABLET ORAL at 09:28

## 2017-04-18 RX ADMIN — DESMOPRESSIN ACETATE 40 MG: 0.2 TABLET ORAL at 09:27

## 2017-04-18 NOTE — CONSULTS
Neurology Consult Note    Referring Provider: Dr. Dalton Triplett  Reason for Consultation: TIA      History of present illness:    83 year old male with transient episode last night of right leg weakness and slurred speech lasting 5 minutes.  He had no previous spells like this but did have a stroke previously with resultant left leg weakness.  The episode last night had no vision changes, no numbness, and no alteration of consciousness.  He is back to a baseline now.  He denies associated chest pain.  He is on Xarelto which he endorses compliance with.    Past Medical History:   Diagnosis Date   • Atrial fibrillation    • Cancer     PROSTATE   • Stroke        Allergies   Allergen Reactions   • Metoprolol Rash     No current facility-administered medications on file prior to encounter.      No current outpatient prescriptions on file prior to encounter.   HOME MEDICATIONS:  1. Lipitor 40 mg p.o. daily.  2. Xarelto 20 mg p.o. daily.     Social History     Social History   • Marital status:      Spouse name: N/A   • Number of children: N/A   • Years of education: N/A     Occupational History   • Not on file.     Social History Main Topics   • Smoking status: Never Smoker   • Smokeless tobacco: Not on file   • Alcohol use No   • Drug use: No   • Sexual activity: Defer     Other Topics Concern   • Not on file     Social History Narrative   • No narrative on file     History reviewed. No pertinent family history.    Review of Systems  A 14 point review of systems was reviewed and was negative except for slurred speech    Vital Signs   Temp:  [97.8 °F (36.6 °C)-98.8 °F (37.1 °C)] 98.1 °F (36.7 °C)  Heart Rate:  [] 87  Resp:  [16-20] 20  BP: (110-157)/(65-91) 113/65    General Exam:  Head:  Normal cephalic, atraumatic  HEENT:  Neck supple.  Cochlear implant in left ear  Fundoscopic Exam:  No signs of disc edema  CVS:  Regular rate and rhythm.  No murmurs  Carotid Examination:  No bruits  Lungs:  Clear to  auscultation  Abdomen:  Non-tender, Non-distended  Extremities:  No signs of peripheral edema  Skin:  No rashes    Neurologic Exam:    Mental Status:    -Awake, Alert, Oriented X 3  -No word finding difficulties  -No aphasia  -No dysarthria  -Follows simple and complex commands    CN II:  Visual fields full.  Pupils equally reactive to light  CN III, IV, VI:  Extraocular Muscles full with no signs of nystagmus  CN V:  Facial sensory is symmetric with no asymetries.  CN VII:  Facial motor symmetric  CN VIII:  Drastic decrease in hearing bilaterally  CN IX:  Palate elevates symmetrically  CN X:  Palate elevates symmetrically  CN XI:  Shoulder shrug symmetric  CN XII:  Tongue is midline on protrusion    Motor: (strength out of 5:  1= minimal movement, 2 = movement in plane of gravity, 3 = movement against gravity, 4 = movement against some resistance, 5 = full strength)    -Right Upper Ext: Proximal: 5 Distal: 5  -Left Upper Ext: Proximal: 5 Distal: 5    -Right Lower Ext: Proximal: 5 Distal: 5  -Left Lower Ext: Proximal: 5 Distal: 5    DTR:  -Right   Bicep: 2+ Tricep: 2+ Brachoradialis: 2+   Patella: 2+ Ankle: 2+ Neg Babinski  -Left   Bicep: 2+ Tricep: 2+ Brachoradialis: 2+   Patella: 2+ Ankle: 2+ Neg Babinski    Sensory:  -Intact to light touch, pinprick, temperature, pain, and proprioception    Coordination:  -Finger to nose intact  -Heel to shin intact  -No ataxia    Gait  -No signs of ataxia  -ambulates unassisted      Results Review:  Lab Results (last 24 hours)     Procedure Component Value Units Date/Time    CBC & Differential [[id number]] Collected:  04/17/17 1944    Specimen:  Blood Updated:  04/17/17 1956    Narrative:       The following orders were created for panel order CBC & Differential.  Procedure                               Abnormality         Status                     ---------                               -----------         ------                     CBC Auto Differential[[id number]]          Abnormal            Final result                 Please view results for these tests on the individual orders.    CBC Auto Differential [[id number]]  (Abnormal) Collected:  04/17/17 1944    Specimen:  Blood Updated:  04/17/17 1956     WBC 10.58 10*3/mm3      RBC 4.46 (L) 10*6/mm3      Hemoglobin 12.8 (L) g/dL      Hematocrit 38.6 (L) %      MCV 86.5 fL      MCH 28.7 pg      MCHC 33.2 g/dL      RDW 14.6 %      RDW-SD 45.6 fl      MPV 9.8 fL      Platelets 221 10*3/mm3      Neutrophil % 66.7 %      Lymphocyte % 22.5 %      Monocyte % 8.4 %      Eosinophil % 1.5 %      Basophil % 0.5 %      Immature Grans % 0.4 %      Neutrophils, Absolute 7.06 10*3/mm3      Lymphocytes, Absolute 2.38 10*3/mm3      Monocytes, Absolute 0.89 10*3/mm3      Eosinophils, Absolute 0.16 10*3/mm3      Basophils, Absolute 0.05 10*3/mm3      Immature Grans, Absolute 0.04 (H) 10*3/mm3     POC Troponin, Rapid [[id number]]  (Normal) Collected:  04/17/17 1953    Specimen:  Blood Updated:  04/17/17 2006     Troponin I 0.01 ng/mL       Serial Number: [id number]    : [id number]       Magnesium [[id number]]  (Normal) Collected:  04/17/17 1944    Specimen:  Blood Updated:  04/17/17 2018     Magnesium 2.2 mg/dL     Comprehensive Metabolic Panel [[id number]]  (Abnormal) Collected:  04/17/17 1944    Specimen:  Blood Updated:  04/17/17 2018     Glucose 97 mg/dL      BUN 44 (H) mg/dL      Creatinine 1.23 mg/dL      Sodium 140 mmol/L      Potassium 4.7 mmol/L      Chloride 101 mmol/L      CO2 26.0 mmol/L      Calcium 9.3 mg/dL      Total Protein 7.7 g/dL      Albumin 4.00 g/dL      ALT (SGPT) 16 U/L      AST (SGOT) 35 U/L      Alkaline Phosphatase 101 U/L      Total Bilirubin 0.7 mg/dL      eGFR Non African Amer 56 (L) mL/min/1.73      Globulin 3.7 gm/dL      A/G Ratio 1.1 g/dL      BUN/Creatinine Ratio 35.8 (H)     Anion Gap 13.0 mmol/L     Narrative:       The MDRD GFR formula is only valid for adults with stable renal function between ages 18 and 70.     Protime-INR [[id number]]  (Abnormal) Collected:  04/17/17 1944    Specimen:  Blood Updated:  04/17/17 2023     Protime 28.6 (H) Seconds      INR 2.58 (H)    aPTT [[id number]]  (Abnormal) Collected:  04/17/17 1944    Specimen:  Blood Updated:  04/17/17 2023     PTT 51.0 (H) seconds     BNP [[id number]]  (Normal) Collected:  04/17/17 1944    Specimen:  Blood Updated:  04/17/17 2029     proBNP 1030.0 pg/mL     Troponin [[id number]]  (Normal) Collected:  04/17/17 1944    Specimen:  Blood Updated:  04/17/17 2029     Troponin I 0.018 ng/mL     [place] Draw [[id number]] Collected:  04/17/17 1944    Specimen:  Blood Updated:  04/18/17 0001    Narrative:       The following orders were created for panel order [facility] Draw.  Procedure                               Abnormality         Status                     ---------                               -----------         ------                     Light Blue Top[[id number]]                                    Final result               Green Top (Gel)[[id number]]                                   Final result               Lavender Top[[id number]]                                      Final result               Red Top[[id number]]                                                                      Green Top (No Gel)[[id number]]                                                             Please view results for these tests on the individual orders.    Light Blue Top [[id number]] Collected:  04/17/17 1944    Specimen:  Blood Updated:  04/18/17 0001     Extra Tube hold for add-on      Auto resulted       Green Top (Gel) [[id number]] Collected:  04/17/17 1944    Specimen:  Blood Updated:  04/18/17 0001     Extra Tube Hold for add-ons.      Auto resulted.       Lavender Top [[id number]] Collected:  04/17/17 1944    Specimen:  Blood Updated:  04/18/17 0001     Extra Tube hold for add-on      Auto resulted       [facility] Draw [[id number]] Collected:  04/17/17 1944    Specimen:  Blood Updated:  04/18/17 0001     Narrative:       The following orders were created for panel order [facility] Draw.  Procedure                               Abnormality         Status                     ---------                               -----------         ------                     Light Blue Top[[id number]]                                                               Green Top (Gel)[[id number]]                                                              Lavender Top[[id number]]                                                                 Red Top[[id number]]                                           Final result               Green Top (No Gel)[[id number]]                                                             Please view results for these tests on the individual orders.    Red Top [[id number]] Collected:  04/17/17 1944    Specimen:  Blood Updated:  04/18/17 0001     Extra Tube Hold for add-ons.      Auto resulted.       Lipid Panel [[id number]]  (Abnormal) Collected:  04/18/17 0414    Specimen:  Blood Updated:  04/18/17 0520     Total Cholesterol 147 mg/dL      Triglycerides 76 mg/dL      HDL Cholesterol 38 (L) mg/dL      LDL Cholesterol  87 mg/dL      LDL/HDL Ratio 2.47    TSH [[id number]]  (Normal) Collected:  04/18/17 0414    Specimen:  Blood Updated:  04/18/17 0540     TSH 3.180 mIU/mL           .  Imaging Results (last 24 hours)     Procedure Component Value Units Date/Time    XR Chest 1 View [[id number]] Collected:  04/17/17 2050     Updated:  04/17/17 2111    Narrative:       HISTORY: TIA symptoms.     EXAMINATION:  XR CHEST 1 VW- 4/17/2017 7:45 PM CDT     FINDINGS: Upright frontal projection of the chest demonstrates right  basilar atelectasis. There is mild cardiomegaly. There is no evidence of  lobar pneumonia or effusion. The patient has undergone prior median  sternotomy for coronary bypass grafting.       Impression:       1. Poor inspiration. There is right basilar atelectasis.  2. Mild cardiomegaly.  This report was finalized on  04/17/2017 21:08 by Dr. Darek Mcgrath MD.    CT Head Without Contrast [[id number]] Collected:  04/17/17 2049     Updated:  04/17/17 2113    Narrative:       HISTORY: TIA symptoms.     EXAMINATION:  CT HEAD WO CONTRAST- 4/17/2017 7:57 PM CDT     DOSE 715 mGycm. Automated exposure control was utilized to diminish  patient radiation dose.     FINDINGS: Multiple contiguous axial images are obtained from the skull  base to the vertex per protocol without intravenous contrast  enhancement, with reformatted images obtained in the sagittal and  coronal projections from the original data set. There is atrophy of the  brain with prominence of the subarachnoid spaces and ventricular  enlargement. Moderate small vessel ischemic changes are noted involving  the corona radiata and centrum semiovale. There is no evidence of mass,  mass effect or shift of the midline structures. There is no evidence of  acute infarct or hemorrhage. There is chronic sinus disease of the  middle and anterior ethmoid air cells on the right and the right frontal  sinus. There is also involvement of the right maxillary sinus with  expansion of the infundibulum. If not previously undertaken, ENT  consultation will be recommended for further assessment.       Impression:       1.. Chronic-appearing right-sided sinus disease including involvement of  the frontal sinus and middle and anterior ethmoid air cells, as well as  the right maxillary sinus. There is right-sided infundibular expansion  with some soft tissue density extending into the nasal cavity. ENT  consultation will be recommended for further assessment. 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   2. Diffuse atrophy with moderate small vessel ischemic disease.  This report was finalized on 04/17/2017 21:10 by Dr. Darek Mcgrath MD.    US Carotid Bilateral [[id number]] Updated:  04/18/17 1137        Thyroid panel negative  LDL:  87  Reviewed Head CT:  No acute brain  disease.  Carotid ultrasound shows no significant stenoses.  Echocardiogram pending but prelim results show no acute pathology with a preserved ejection fraction.  Impression    1.  TIA  2.  Atrial fibrillation  3.  History of cochlear implant on left making him not an MRI candidate  4.  Hyperlipidemia    Plan    1.  Add ASA 81mg to Xarelto  2.  Increase Lipitor to 80mg with LDL goal of <70  3.  Ok for discharge home with follow up in the neurology clinic in 4 weeks    I discussed the patients findings and my recommendations with patient, family and primary care team.  Relayed plan to primary attending.    Joe Carreno MD  04/18/17  4:14 PM

## 2017-04-18 NOTE — H&P
"   TIME: 11:50 p.m.     PRIMARY CARE PHYSICIAN: Zia Benitez MD    HISTORY OF PRESENT ILLNESS: Mr. Gerber is an 83-year-old  male who presents to [facility] due to an episode of slurred speech associated with right leg weakness. His symptoms started earlier this evening while attending his granddaughter’s sporting event. His symptoms lasted for approximately 15 minutes and then resolved completely. He relates no other symptoms other than an unsteady gait. His sons were present at the onset of his symptoms and were able to assist him to a chair. They relate that he was unable to walk. The patient relates that his right leg felt as if it weighed \"50 pounds\". Presently, he is awake and alert. He has no complaints. He is in no apparent distress. He relates his symptoms have completely resolved. He passed a bedside swallow evaluation.     REVIEW OF SYSTEMS: Cannot be adequately obtained due to the patient's severe hearing impairment.     PAST MEDICAL HISTORY:   1.  Coronary artery disease.   2.  Chronic atrial fibrillation.   3.  Chronic anticoagulation with Xarelto.  4.  Cerebrovascular disease with history of stroke.   5.  Dyslipidemia.   6.  Allergic rhinosinusitis.   7.  Severe hearing impairment.     PAST SURGICAL HISTORY:  1.  Status post 3 vessel coronary artery bypass grafting.   2.  Status post prostatectomy.   3.  Status post cochlear implant for hearing impairment.     ALLERGIES: METOPROLOL (severe skin rash).    HOME MEDICATIONS:  1.  Lipitor 40 mg p.o. daily.  2.  Xarelto 20 mg p.o. daily.     SOCIAL HISTORY: Significant for being a resident of [place], [place]. He is . He has 3 sons and a daughter in good health. He is a retired now, having worked many years as a . He has an 8th grade education. He has no history of tobacco, alcohol or drug use. He is [employer]. He has no recent history of travel outside this region.     His son Avtar Gerber will serve as a " surrogate for healthcare matters should such become necessary.     He is a FULL CODE.     FAMILY HISTORY: Significant for having a brother and 2 sisters all reported to be in good health. His father is  due to complications of dementia. His mother is  due to natural causes of uncertain type.     PHYSICAL EXAMINATION:  VITAL SIGNS: Temperature is 98, pulse is 90, respirations are 18 and unlabored, blood pressure is 123/73, O2 saturations 98% breathing ambient air, weight is 144 pounds.     GENERAL: This is an 83-year-old  male appearing his documented age. He is resting comfortably in bed. He is in no apparent distress. He is interactive and cooperative. He appears to be a fair historian; however, obtaining a history is difficult due to his hearing impairment. Thankfully, 2 sons are present at the bedside and provided much of the information contained in this document.     HEAD AND NECK: Essentially unremarkable except as noted. I see no signs of acute trauma. Eyes, nose, and throat are grossly unremarkable. Sclerae are clear. There is no discharge from the nostrils. Mucous membranes are moist. Neck is supple. He has no cervical or clavicular adenopathy. He has no definite carotid bruits. There are no masses of the head or neck. Neck veins do not appear pathologically distended.     CARDIAC: Reveals S1 and S2 with a regular rhythm. He has a 1/6 systolic murmur heard best along the left sternal border.     LUNGS: Reveals bilateral breath sounds are clear to auscultation throughout. He has no rales, wheezes, or rhonchi.     ABDOMEN: Reveals bowel sounds to be present. His abdomen is nontender, nondistended and soft.     EXTREMITIES: No lower extremity edema, erythema or calf tenderness.     NEUROLOGIC: Reveals the patient to be awake and alert. He seems oriented to person, place, time and situation. Cranial nerves II-XII are grossly intact except as noted. He has a severe hearing impairment. He  exhibits no definite focal, motor or sensory deficits. He seems able to move all extremities without difficulty and at will. His gait was not tested.     PSYCHIATRIC: Reveals his mood to be stable. Affect seems appropriate. Thought processes seem organized in that he is able to answer some questions and provide a fairly coherent history. Speech is fluent. There is no flight of ideas. There are no obvious short-term or long-term memory deficits; however, this component of his examination is suboptimal due to his status.     DIAGNOSTIC DATA: Demonstrate a complete metabolic panel which is essentially unremarkable except for a BUN of 44.     Likewise, CBC is essentially unremarkable. Hemoglobin is 12.8 with hematocrit 38.6.     Prothrombin time is 28.6 with an INR 2.58 and a PTT of 51.     ProBNP is 1030.     Troponin level is 0.018.     Magnesium level is 2.2.     CT of the head demonstrates chronic-appearing right-sided sinus disease. Please refer to the formal interpretation for particulars. Diffuse atrophy and moderate small vessel ischemic changes are noted.     Chest x-ray demonstrates right basilar atelectasis and mild cardiomegaly.     EKG demonstrates atrial fibrillation with an incomplete right bundle branch block. Left anterior hemiblock is noted. There is evidence of anteroseptal MI of indeterminate age.     IMPRESSION:  1.  TIA.   2.  Cerebrovascular disease with history of CVA.   3.  Coronary artery disease.   4.  Dyslipidemia.   5.  Chronic AFib.   6.  Xarelto therapy.      PLAN: At this time, Mr. Gerber will be admitted to [facility] for further evaluation and treatment. His admitting diagnoses are as noted. His condition at this time is judged to be stable. He will be placed on telemetry.     I have asked the nursing staff to obtain vital signs per protocol. He will be confined to bedrest with bathroom privileges with assistance. His allergy to METOPROLOL is noted. I have asked the  nursing staff to monitor input and output. Daily weights will be obtained. Neurologic checks will be obtained. He will be maintained on a cardiac diet. IV fluids will be saline locked. Oxygen will be used as needed to maintain his O2 saturations greater than 92%. He is a FULL CODE. Fall precautions are to be instituted. I will consult the neurology service.     INITIAL ADMITTING MEDICATIONS:   1.  Aspirin 81 mg p.o. daily.   2.  Lipitor 40 mg p.o. daily.   3.  Protonix 40 mg p.o. daily.   4.  Xarelto 20 mg p.o. daily.   5.  Tylenol 650 mg p.o. q.6 h. p.r.n. for fever and/or discomfort.   6.  DuoNeb 1 unit q.4 h. p.r.n. for shortness of breath.  7.  Zofran 4 mg IV q.6 h. p.r.n. for nausea and vomiting.     I will obtain a TSH and lipid panel in the morning.     I will continue to follow Mr. Gerber closely through the night pending return of the hospitalist team in the morning. The nursing staff may certainly call should they have any questions or concerns. Please refer to the medical record for additional information, orders and/or comments.       cc:  IRIS HERNANDEZ M.D. JRP/[id number]  D:  04/18/2017 01:11:01(Eastern Time)  T:  04/18/2017 01:28:27(Eastern Time)  Voice ID:  [id number]/Document ID:  [id number]

## 2017-04-18 NOTE — DISCHARGE SUMMARY
[facility] Medicine Services  DISCHARGE SUMMARY       Date of Admission: 4/17/2017  Date of Discharge:  4/18/2017  Primary Care Physician: Zia Benitez MD    Discharge Diagnoses:  Hospital Problem List     Transient cerebral ischemia  CVA on Xarelto  Afib  CAD  Dyslipidemia  Normocytic anemia        Procedures Performed: None    Pertinent Test Results:   Imaging Results (last 24 hours)     Procedure Component Value Units Date/Time     XR Chest 1 View [[id number]] Collected: 04/17/17 2050       Updated: 04/17/17 2111     Narrative:       HISTORY: TIA symptoms.      EXAMINATION: XR CHEST 1 VW- 4/17/2017 7:45 PM CDT      FINDINGS: Upright frontal projection of the chest demonstrates right  basilar atelectasis. There is mild cardiomegaly. There is no evidence of  lobar pneumonia or effusion. The patient has undergone prior median  sternotomy for coronary bypass grafting.         Impression:       1. Poor inspiration. There is right basilar atelectasis.  2. Mild cardiomegaly.  This report was finalized on 04/17/2017 21:08 by Dr. Darek Mcgrath MD.     CT Head Without Contrast [[id number]] Collected: 04/17/17 2049       Updated: 04/17/17 2113     Narrative:       HISTORY: TIA symptoms.      EXAMINATION: CT HEAD WO CONTRAST- 4/17/2017 7:57 PM CDT      DOSE 715 mGycm. Automated exposure control was utilized to diminish  patient radiation dose.      FINDINGS: Multiple contiguous axial images are obtained from the skull  base to the vertex per protocol without intravenous contrast  enhancement, with reformatted images obtained in the sagittal and  coronal projections from the original data set. There is atrophy of the  brain with prominence of the subarachnoid spaces and ventricular  enlargement. Moderate small vessel ischemic changes are noted involving  the corona radiata and centrum semiovale. There is no evidence of mass,  mass effect or shift of the midline structures. There is  no evidence of  acute infarct or hemorrhage. There is chronic sinus disease of the  middle and anterior ethmoid air cells on the right and the right frontal  sinus. There is also involvement of the right maxillary sinus with  expansion of the infundibulum. If not previously undertaken, ENT  consultation will be recommended for further assessment.         Impression:       1.. Chronic-appearing right-sided sinus disease including involvement of  the frontal sinus and middle and anterior ethmoid air cells, as well as  the right maxillary sinus. There is right-sided infundibular expansion  with some soft tissue density extending into the nasal cavity. ENT  consultation will be recommended for further assessment. 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   2. Diffuse atrophy with moderate small vessel ischemic disease.  This report was finalized on 04/17/2017 21:10 by Dr. Darek Mcgrath MD.         Lab Results (all)     Procedure Component Value Units Date/Time    CBC Auto Differential [[id number]]  (Abnormal) Collected:  04/17/17 1944    Specimen:  Blood Updated:  04/17/17 1956     WBC 10.58 10*3/mm3      RBC 4.46 (L) 10*6/mm3      Hemoglobin 12.8 (L) g/dL      Hematocrit 38.6 (L) %      MCV 86.5 fL      MCH 28.7 pg      MCHC 33.2 g/dL      RDW 14.6 %      RDW-SD 45.6 fl      MPV 9.8 fL      Platelets 221 10*3/mm3      Neutrophil % 66.7 %      Lymphocyte % 22.5 %      Monocyte % 8.4 %      Eosinophil % 1.5 %      Basophil % 0.5 %      Immature Grans % 0.4 %      Neutrophils, Absolute 7.06 10*3/mm3      Lymphocytes, Absolute 2.38 10*3/mm3      Monocytes, Absolute 0.89 10*3/mm3      Eosinophils, Absolute 0.16 10*3/mm3      Basophils, Absolute 0.05 10*3/mm3      Immature Grans, Absolute 0.04 (H) 10*3/mm3     POC Troponin, Rapid [[id number]]  (Normal) Collected:  04/17/17 1953    Specimen:  Blood Updated:  04/17/17 2006     Troponin I 0.01 ng/mL       Serial Number: [id number]    : [id number]        Magnesium [[id number]]  (Normal) Collected:  04/17/17 1944    Specimen:  Blood Updated:  04/17/17 2018     Magnesium 2.2 mg/dL     Comprehensive Metabolic Panel [[id number]]  (Abnormal) Collected:  04/17/17 1944    Specimen:  Blood Updated:  04/17/17 2018     Glucose 97 mg/dL      BUN 44 (H) mg/dL      Creatinine 1.23 mg/dL      Sodium 140 mmol/L      Potassium 4.7 mmol/L      Chloride 101 mmol/L      CO2 26.0 mmol/L      Calcium 9.3 mg/dL      Total Protein 7.7 g/dL      Albumin 4.00 g/dL      ALT (SGPT) 16 U/L      AST (SGOT) 35 U/L      Alkaline Phosphatase 101 U/L      Total Bilirubin 0.7 mg/dL      eGFR Non African Amer 56 (L) mL/min/1.73      Globulin 3.7 gm/dL      A/G Ratio 1.1 g/dL      BUN/Creatinine Ratio 35.8 (H)     Anion Gap 13.0 mmol/L     Protime-INR [[id number]]  (Abnormal) Collected:  04/17/17 1944    Specimen:  Blood Updated:  04/17/17 2023     Protime 28.6 (H) Seconds      INR 2.58 (H)    aPTT [[id number]]  (Abnormal) Collected:  04/17/17 1944    Specimen:  Blood Updated:  04/17/17 2023     PTT 51.0 (H) seconds     BNP [[id number]]  (Normal) Collected:  04/17/17 1944    Specimen:  Blood Updated:  04/17/17 2029     proBNP 1030.0 pg/mL     Troponin [[id number]]  (Normal) Collected:  04/17/17 1944    Specimen:  Blood Updated:  04/17/17 2029     Troponin I 0.018 ng/mL     Lipid Panel [[id number]]  (Abnormal) Collected:  04/18/17 0414    Specimen:  Blood Updated:  04/18/17 0520     Total Cholesterol 147 mg/dL      Triglycerides 76 mg/dL      HDL Cholesterol 38 (L) mg/dL      LDL Cholesterol  87 mg/dL      LDL/HDL Ratio 2.47    TSH [[id number]]  (Normal) Collected:  04/18/17 0414    Specimen:  Blood Updated:  04/18/17 0540     TSH 3.180 mIU/mL           Consults: Joe Carreno M.D. neurology    Chief Complaint on Day of Discharge: None, anxious to go home    Hospital Course  Patient is a 83 y.o. male presented with transient ischemic attack.  He states he was at a ball game yesterday and while walking develop  "bilateral lower extremity weakness with inability to ambulate stating, he felt his legs weighed at least 50 pounds each. He was assisted to a sitting position and was unable to describe his symptoms due to dysarthria. He had no pain no numbness. All symptoms have resolved. And currently he is just waiting to go home. He does report frequent lower extremity cramping at night.  He has a history of coronary artery disease, mild chronic atrial fibrillation, on anticoagulation with Xarelto, CVA with left-sided residual weakness, dyslipidemia and severe hearing impairment with cochlear implant.  Currently patient is quite stable for discharge after complete neuro workup which was negative for recurrent stroke.  His medications have been manipulated and he will be discharged in stable condition via private vehicle.    Condition on Discharge:  Stable    Physical Exam on Discharge:  /65 (BP Location: Right arm)  Pulse 87  Temp 98.1 °F (36.7 °C) (Oral)   Resp 20  Ht 65\" (165.1 cm)  Wt 144 lb 8 oz (65.5 kg)  SpO2 98%  BMI 24.05 kg/m2     Physical Exam  Constitutional: He is oriented to person, place, and time. He appears well-developed and well-nourished. No distress.   HENT:   Head: Normocephalic and atraumatic.   [place]   Eyes: Conjunctivae and EOM are normal. Pupils are equal, round, and reactive to light. No scleral icterus.   Neck: Normal range of motion. Neck supple. No JVD present. No tracheal deviation present.   Cardiovascular: Normal heart sounds and intact distal pulses. Exam reveals no gallop.   No murmur heard.  Irregular - afib 85   Pulmonary/Chest: Effort normal and breath sounds normal. No respiratory distress. He has no wheezes. He has no rales.   Abdominal: Soft. Bowel sounds are normal. He exhibits no distension. There is no tenderness. There is no guarding.   Musculoskeletal: Normal range of motion. He exhibits no edema.   Neurological: He is alert and oriented to person, place, and time. "   Residual left-sided weakness from remote CVA   Skin: Skin is warm and dry. No rash noted. He is not diaphoretic. No erythema. No pallor.   Psychiatric: He has a normal mood and affect. His behavior is normal.   Vitals reviewed.    Discharge Disposition:  Home or Self Care    Discharge Medications:   Alejandro Gerber   Home Medication Instructions NATE:[id number]    Printed on:04/18/17 1646   Medication Information                      aspirin 81 MG EC tablet  Take 1 tablet by mouth Daily.             atorvastatin (LIPITOR) 80 MG tablet  Take 1 tablet by mouth Daily.             pantoprazole (PROTONIX) 40 MG EC tablet  Take 1 tablet by mouth Daily.             rivaroxaban (XARELTO) 20 MG tablet  Take 20 mg by mouth Daily.                 Discharge Diet:   Diet Instructions     Diet: Cardiac; Thin Liquids, No Restrictions       Discharge Diet:  Cardiac   Fluid Consistency:  Thin Liquids, No Restrictions                 Discharge Care Plan / Instructions: Follow-up with neurology and primary care as instructed    Activity at Discharge:   Activity Instructions     Activity as Tolerated                     Follow-up Appointments: Neurology clinic, Theodora Rodas NP 3 weeks; Zia Benitez M.D. primary care provider 1 week    Test Results Pending at Discharge: None     Plan discussed with Dr. Petros Triplett.     Time spent in face-to-face evaluation, chart review, planning and education 35 minutes.    RU Murphy  04/18/17  4:41 PM    I personally evaluated and examined the patient in conjunction with RU Sin and agree with the assessment, treatment plan, and disposition of the patient as recorded by her. My history, exam, and further recommendations are:     Dr. Carreno and I saw the patient together with his son.  We do feel that the patient likely had a transient ischemic attack.  The patient is unable to complete an MRI secondary to having an MRI incompatible with cochlear implant We  explained to the patient and his son that being on Xarelto is definitely indicated for his history of atrial fibrillation to decrease stroke risk, but in no way makes his stroke risk nil.  Dr. Carreno recommends the patient also be on a baby aspirin as well.  The patient and his son expressed understanding of this.  We have no plans to change any of his other medical regimen.    He desperately wants to be discharged home.  He has an appointment at [facility] tomorrow to have his cochlear implant managed again.    Petros Triplett,   04/18/17  5:08 PM

## 2017-04-18 NOTE — PROGRESS NOTES
"Discharge Planning Assessment  [facility]     Patient Name: Alejandro Gerber  MRN: [id number]  Today's Date: 4/18/2017    Admit Date: 4/17/2017          Discharge Needs Assessment       04/18/17 0922    Living Environment    Lives With spouse    Living Arrangements house    Quality Of Family Relationships supportive;helpful;involved    Able to Return to Prior Living Arrangements yes    Discharge Needs Assessment    Concerns To Be Addressed no discharge needs identified;denies needs/concerns at this time    Readmission Within The Last 30 Days no previous admission in last 30 days    Anticipated Changes Related to Illness none    Equipment Needed After Discharge none    Transportation Available family or friend will provide;car    Discharge Planning Comments Lala Tellez, parish RN consulted  for \"? tia\".  unsure of reason to consult SW. H&P note includes statement that patient's symptoms had resolved. Patient lives with spouse and denies any discharge planning needs. Therapy was not ordered.             Discharge Plan     Home; no needs        Discharge Placement     No information found                Demographic Summary     None            Functional Status     None            Psychosocial     None            Abuse/Neglect     None            Legal     None            Substance Abuse     None            Patient Forms     None          GEOVANNY Chicas  Continued Stay Note   [place]     Patient Name: Alejandro Gerber  MRN: [id number]  Today's Date: 4/18/2017    Admit Date: 4/17/2017          Discharge Plan     None              Discharge Codes     None            GEOVANNY Chicas    "

## 2017-04-18 NOTE — ED PROVIDER NOTES
Subjective   HPI Comments: Patient is a 83-year-old male with reported history of a stroke 4-5 years ago that affected his left side of his body.  Patient reportedly was at a volleyball game tonight and began walking (approximately one hour ago) and as he was walking he had significant right leg weakness that almost caused him to fall.  The family at the bedside reports that he also had slurred speech.  The family reports that all of this resolved after approximately 15 minutes of onset.  Patient has no complaints currently.      History provided by:  Patient (Son & daughter-in-law)      Review of Systems   HENT: Positive for hearing loss (Chronic).    Eyes: Negative.    Respiratory: Negative.    Cardiovascular: Negative.    Gastrointestinal: Negative.    Endocrine: Negative.    Genitourinary: Negative.    Musculoskeletal: Negative.    Skin: Negative.    Allergic/Immunologic: Negative.    Neurological: Positive for speech difficulty and weakness (Right leg with chronic deficit of left leg due to previous stroke.).   Hematological: Negative.    Psychiatric/Behavioral: Negative.    All other systems reviewed and are negative.      Past Medical History:   Diagnosis Date   • Atrial fibrillation    • Cancer     PROSTATE   • Stroke        Allergies   Allergen Reactions   • Metoprolol Rash       Past Surgical History:   Procedure Laterality Date   • CARDIAC SURGERY     • PROSTATECTOMY         History reviewed. No pertinent family history.    Social History     Social History   • Marital status:      Spouse name: N/A   • Number of children: N/A   • Years of education: N/A     Social History Main Topics   • Smoking status: Never Smoker   • Smokeless tobacco: None   • Alcohol use No   • Drug use: No   • Sexual activity: Defer     Other Topics Concern   • None     Social History Narrative   • None           Objective   Physical Exam   Constitutional: He is oriented to person, place, and time. He appears well-developed  and well-nourished.   Elderly appearing male   HENT:   Head: Normocephalic and atraumatic.   Right Ear: External ear normal.   Left Ear: External ear normal.   Nose: Nose normal.   Mouth/Throat: Oropharynx is clear and moist.   Mild hard of hearing   Eyes: Conjunctivae and EOM are normal. Pupils are equal, round, and reactive to light. Right eye exhibits no discharge. Left eye exhibits no discharge.   Neck: Normal range of motion. Neck supple. No tracheal deviation present. No thyromegaly present.   Cardiovascular: Normal rate and intact distal pulses.    Murmur heard.  Irregular irregular rhythm   Pulmonary/Chest: Effort normal and breath sounds normal. No respiratory distress. He exhibits no tenderness.   Abdominal: Soft. He exhibits no distension. There is no tenderness.   Musculoskeletal: Normal range of motion. He exhibits no edema, tenderness or deformity.   Neurological: He is alert and oriented to person, place, and time. No cranial nerve deficit.   Skin: Skin is warm and dry. No erythema. No pallor.   Psychiatric: He has a normal mood and affect. Judgment normal.   Nursing note and vitals reviewed.      Procedures         ED Course  ED Course   Comment By Time   Patient's case was discussed with Dr. Geiger and he agrees to admit the patient for observation. Dwain Ca MD 04/17 2059                  MDM  Number of Diagnoses or Management Options  Abnormal CT scan of head: new and requires workup  Chronic atrial fibrillation: established and improving  Elevated BUN: new and requires workup  ENT disease: new and requires workup  Transient cerebral ischemia, unspecified type: new and requires workup     Amount and/or Complexity of Data Reviewed  Clinical lab tests: ordered and reviewed  Tests in the radiology section of CPT®: ordered and reviewed  Discuss the patient with other providers: yes    Risk of Complications, Morbidity, and/or Mortality  Presenting problems: moderate  Diagnostic procedures:  moderate  Management options: low    Patient Progress  Patient progress: stable      Final diagnoses:   Transient cerebral ischemia, unspecified type   Elevated BUN   Chronic atrial fibrillation   Abnormal CT scan of head   ENT disease            Dwain Ca MD  04/17/17 0905

## 2017-04-18 NOTE — PLAN OF CARE
Problem: Patient Care Overview (Adult)  Goal: Plan of Care Review  Outcome: Ongoing (interventions implemented as appropriate)    04/18/17 0651   Coping/Psychosocial Response Interventions   Plan Of Care Reviewed With patient;merlin   Patient Care Overview   Progress improving   Outcome Evaluation   Outcome Summary/Follow up Plan Pt. admitted for TIA that resolved, NIH 0, A/O, swallowing w/o difficulty, neuro consulted, VSS, no c/o pain.          Problem: Stroke (Ischemic) (Adult)  Goal: Signs and Symptoms of Listed Potential Problems Will be Absent or Manageable (Stroke)  Outcome: Ongoing (interventions implemented as appropriate)

## 2017-04-21 ENCOUNTER — OFFICE VISIT (OUTPATIENT)
Dept: CARDIOLOGY | Facility: CLINIC | Age: 82
End: 2017-04-21

## 2017-04-21 VITALS
WEIGHT: 145.8 LBS | HEIGHT: 65 IN | DIASTOLIC BLOOD PRESSURE: 60 MMHG | SYSTOLIC BLOOD PRESSURE: 120 MMHG | HEART RATE: 87 BPM | BODY MASS INDEX: 24.29 KG/M2

## 2017-04-21 DIAGNOSIS — E78.2 MIXED HYPERLIPIDEMIA: ICD-10-CM

## 2017-04-21 DIAGNOSIS — I35.0 NONRHEUMATIC AORTIC VALVE STENOSIS: ICD-10-CM

## 2017-04-21 DIAGNOSIS — I48.91 ATRIAL FIBRILLATION, UNSPECIFIED TYPE (HCC): Primary | ICD-10-CM

## 2017-04-21 DIAGNOSIS — I25.10 CORONARY ARTERY DISEASE INVOLVING NATIVE CORONARY ARTERY OF NATIVE HEART WITHOUT ANGINA PECTORIS: ICD-10-CM

## 2017-04-21 DIAGNOSIS — Z86.73 HX-TIA (TRANSIENT ISCHEMIC ATTACK): ICD-10-CM

## 2017-04-21 PROCEDURE — 99214 OFFICE O/P EST MOD 30 MIN: CPT | Performed by: INTERNAL MEDICINE

## 2017-04-21 RX ORDER — PANTOPRAZOLE SODIUM 40 MG/1
40 TABLET, DELAYED RELEASE ORAL DAILY
COMMUNITY
End: 2017-05-24 | Stop reason: SDUPTHER

## 2017-04-21 RX ORDER — ASPIRIN 81 MG/1
81 TABLET ORAL DAILY
COMMUNITY
End: 2017-05-24 | Stop reason: SDUPTHER

## 2017-04-21 NOTE — PROGRESS NOTES
Subjective:     Encounter Date:04/21/2017      Patient ID: Alejandro Gerber is a 83 y.o. male with history of chronic afib, CAD, AS, CVA, TIA, HLD who [facility]. The patient relates he was hospitalized 4/18/17 for TIA. He was placed on aspirin in addition to Xarelto per neurology. He has been doing well since. During this admission, electrocardiogram showed atrial fibrillation with rate well controlled. He denies any recent CP, dyspnea, leg swelling, dizziness or related.     Chief Complaint:  Atrial Fibrillation   Presents for follow-up visit. Symptoms are negative for chest pain, dizziness, palpitations, shortness of breath, syncope and weakness. The symptoms have been stable. Past medical history includes atrial fibrillation, CAD and hyperlipidemia. There are no medication compliance problems.   Coronary Artery Disease   Presents for follow-up visit. Pertinent negatives include no chest pain, chest pressure, dizziness, leg swelling, palpitations, shortness of breath or weight gain. Risk factors include hyperlipidemia. The symptoms have been resolved. Compliance with diet is good. Compliance with exercise is good. Compliance with medications is good.   Hypertension   This is a chronic problem. The current episode started more than 1 year ago. The problem has been gradually improving since onset. The problem is controlled. Pertinent negatives include no chest pain, malaise/fatigue, orthopnea, palpitations, PND or shortness of breath. There are no associated agents to hypertension. Risk factors for coronary artery disease include male gender and dyslipidemia. Past treatments include calcium channel blockers. The current treatment provides moderate improvement. There are no compliance problems.  Hypertensive end-organ damage includes CAD/MI. There is no history of chronic renal disease.   Hyperlipidemia   This is a chronic problem. The current episode started more than 1 year  "ago. The problem is controlled. He has no history of chronic renal disease or diabetes. Pertinent negatives include no chest pain, focal weakness, myalgias or shortness of breath. Current antihyperlipidemic treatment includes statins. The current treatment provides moderate improvement of lipids. There are no compliance problems.  Risk factors for coronary artery disease include dyslipidemia, hypertension and male sex.       The following portions of the patient's history were reviewed and updated as appropriate: allergies, current medications, past family history, past medical history, past social history, past surgical history and problem list.    Review of Systems   Constitution: Negative for weakness, malaise/fatigue and weight gain.   Cardiovascular: Negative for chest pain, claudication, dyspnea on exertion, irregular heartbeat, leg swelling, near-syncope, orthopnea, palpitations, paroxysmal nocturnal dyspnea and syncope.   Respiratory: Negative for hemoptysis and shortness of breath.    Hematologic/Lymphatic: Negative for bleeding problem.   Skin: Negative for poor wound healing.   Musculoskeletal: Negative for myalgias.   Gastrointestinal: Negative for melena, nausea and vomiting.   Genitourinary: Negative for hematuria.   Neurological: Negative for dizziness, focal weakness and light-headedness.   Psychiatric/Behavioral: Negative for memory loss.   All other systems reviewed and are negative.      Procedures  /60  Pulse 87  Ht 65\" (165.1 cm)  Wt 145 lb 12.8 oz (66.1 kg)  BMI 24.26 kg/m2    Current Outpatient Prescriptions:   •  aspirin 81 MG EC tablet, Take 81 mg by mouth Daily., Disp: , Rfl:   •  atorvastatin (LIPITOR) 40 MG tablet, 80 mg., Disp: , Rfl:   •  diltiaZEM CD (CARDIZEM CD) 120 MG 24 hr capsule, , Disp: , Rfl:   •  pantoprazole (PROTONIX) 40 MG EC tablet, Take 40 mg by mouth Daily., Disp: , Rfl:   •  rivaroxaban (XARELTO) 20 MG tablet, Take 20 mg by mouth Daily., Disp: , Rfl:   Past " Medical History:   Diagnosis Date   • Allergic rhinitis    • Asthma    • Atrial fibrillation    • Chronic anticoagulation    • Chronic sinusitis    • Coronary artery disease    • Eustachian tube dysfunction    • Hard of hearing    • Hyperlipidemia    • Hypertrophy of inferior nasal turbinate    • Prostate cancer    • Shingles     2 years ago   • Stroke      Past Surgical History:   Procedure Laterality Date   • COCHLEAR IMPLANT REVISION      insertion   • CORONARY ARTERY BYPASS GRAFT     • PROSTATE SURGERY       No Known Allergies  Social History     Social History   • Marital status:      Spouse name: N/A   • Number of children: N/A   • Years of education: N/A     Occupational History   • Not on file.     Social History Main Topics   • Smoking status: Never Smoker   • Smokeless tobacco: Never Used   • Alcohol use No   • Drug use: No   • Sexual activity: Defer     Other Topics Concern   • Not on file     Social History Narrative     Family History   Problem Relation Age of Onset   • No Known Problems Mother    • Alzheimer's disease Father           Objective:     Physical Exam   Constitutional: He is oriented to person, place, and time. He appears well-developed and well-nourished.   HENT:   Head: Normocephalic and atraumatic.   Eyes: Conjunctivae and EOM are normal. Pupils are equal, round, and reactive to light.   Neck: Normal range of motion. Neck supple. No JVD present.   Cardiovascular: Normal rate, regular rhythm, S1 normal, S2 normal, intact distal pulses and normal pulses.    Murmur heard.   Medium-pitched harsh systolic murmur is present with a grade of 3/6   Pulmonary/Chest: Effort normal and breath sounds normal. No respiratory distress.   Abdominal: Soft. Bowel sounds are normal. He exhibits no distension.   Musculoskeletal: He exhibits no edema.   Neurological: He is alert and oriented to person, place, and time.   Skin: Skin is warm and dry.   Psychiatric: He has a normal mood and affect.  Judgment normal.             Assessment:          Diagnosis Plan   1. Atrial fibrillation, unspecified type      Rate controlled. Anticoagulated on Xarelto    2. Nonrheumatic aortic valve stenosis      Mild on 2017 echo    3. Coronary artery disease involving native coronary artery of native heart without angina pectoris     4. Hx-TIA (transient ischemic attack)      Patient was recently hospitalized 17 with TIA, was started on aspirin in addition to his Xarelto at this time per neurology    5. Mixed hyperlipidemia      Followed by PCP, on statin            Plan:     **please note patient has an additional chart in epic, under  1934**   1. Continue current therapy  2. Counseled on diet, medication and exercise compliance  3. Follow-up in 1 year, unless needed sooner. Follow-up with neurology as scheduled.   4. Verbalized understanding of instructions.

## 2017-05-23 ENCOUNTER — HOSPITAL ENCOUNTER (EMERGENCY)
Facility: HOSPITAL | Age: 82
Discharge: HOME | End: 2017-05-23
Attending: EMERGENCY MEDICINE | Admitting: EMERGENCY MEDICINE

## 2017-05-23 ENCOUNTER — APPOINTMENT (OUTPATIENT)
Dept: CT IMAGING | Facility: HOSPITAL | Age: 82
End: 2017-05-23

## 2017-05-23 ENCOUNTER — APPOINTMENT (OUTPATIENT)
Dept: GENERAL RADIOLOGY | Facility: HOSPITAL | Age: 82
End: 2017-05-23

## 2017-05-23 VITALS
HEART RATE: 75 BPM | BODY MASS INDEX: 21.26 KG/M2 | DIASTOLIC BLOOD PRESSURE: 76 MMHG | RESPIRATION RATE: 19 BRPM | WEIGHT: 157 LBS | OXYGEN SATURATION: 97 % | HEIGHT: 72 IN | SYSTOLIC BLOOD PRESSURE: 160 MMHG | TEMPERATURE: 98.2 F
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
## 2017-05-23 DIAGNOSIS — S01.01XA SCALP LACERATION, INITIAL ENCOUNTER: ICD-10-CM

## 2017-05-23 LAB
ALBUMIN SERPL-MCNC: 3.6 G/DL (ref 3.5–5)
ALBUMIN/GLOB SERPL: 1.2 G/DL (ref 1.1–2.5)
ALP SERPL-CCNC: 83 U/L (ref 24–120)
ALT SERPL W P-5'-P-CCNC: 34 U/L (ref 0–54)
ANION GAP SERPL CALCULATED.3IONS-SCNC: 10 MMOL/L (ref 4–13)
AST SERPL-CCNC: 36 U/L (ref 7–45)
BASOPHILS # BLD AUTO: 0.03 10*3/MM3 (ref 0–0.2)
BASOPHILS NFR BLD AUTO: 0.4 % (ref 0–2)
BILIRUB SERPL-MCNC: 1.1 MG/DL (ref 0.1–1)
BUN BLD-MCNC: 22 MG/DL (ref 5–21)
BUN/CREAT SERPL: 21.8 (ref 7–25)
CALCIUM SPEC-SCNC: 8.9 MG/DL (ref 8.4–10.4)
CHLORIDE SERPL-SCNC: 102 MMOL/L (ref 98–110)
CO2 SERPL-SCNC: 28 MMOL/L (ref 24–31)
CREAT BLD-MCNC: 1.01 MG/DL (ref 0.5–1.4)
DEPRECATED RDW RBC AUTO: 45.6 FL (ref 40–54)
EOSINOPHIL # BLD AUTO: 0.05 10*3/MM3 (ref 0–0.7)
EOSINOPHIL NFR BLD AUTO: 0.7 % (ref 0–4)
ERYTHROCYTE [DISTWIDTH] IN BLOOD BY AUTOMATED COUNT: 14.3 % (ref 12–15)
GFR SERPL CREATININE-BSD FRML MDRD: 71 ML/MIN/1.73
GLOBULIN UR ELPH-MCNC: 3.1 GM/DL
GLUCOSE BLD-MCNC: 99 MG/DL (ref 70–100)
HCT VFR BLD AUTO: 34.3 % (ref 40–52)
HGB BLD-MCNC: 11.6 G/DL (ref 14–18)
IMM GRANULOCYTES # BLD: 0.02 10*3/MM3 (ref 0–0.03)
IMM GRANULOCYTES NFR BLD: 0.3 % (ref 0–5)
LYMPHOCYTES # BLD AUTO: 1.78 10*3/MM3 (ref 0.72–4.86)
LYMPHOCYTES NFR BLD AUTO: 26.7 % (ref 15–45)
MCH RBC QN AUTO: 29.7 PG (ref 28–32)
MCHC RBC AUTO-ENTMCNC: 33.8 G/DL (ref 33–36)
MCV RBC AUTO: 87.7 FL (ref 82–95)
MONOCYTES # BLD AUTO: 0.48 10*3/MM3 (ref 0.19–1.3)
MONOCYTES NFR BLD AUTO: 7.2 % (ref 4–12)
NEUTROPHILS # BLD AUTO: 4.31 10*3/MM3 (ref 1.87–8.4)
NEUTROPHILS NFR BLD AUTO: 64.7 % (ref 39–78)
PLATELET # BLD AUTO: 182 10*3/MM3 (ref 130–400)
PMV BLD AUTO: 9.6 FL (ref 6–12)
POTASSIUM BLD-SCNC: 4.4 MMOL/L (ref 3.5–5.3)
PROT SERPL-MCNC: 6.7 G/DL (ref 6.3–8.7)
RBC # BLD AUTO: 3.91 10*6/MM3 (ref 4.8–5.9)
SODIUM BLD-SCNC: 140 MMOL/L (ref 135–145)
WBC NRBC COR # BLD: 6.67 10*3/MM3 (ref 4.8–10.8)

## 2017-05-23 PROCEDURE — 80053 COMPREHEN METABOLIC PANEL: CPT | Performed by: EMERGENCY MEDICINE

## 2017-05-23 PROCEDURE — 72125 CT NECK SPINE W/O DYE: CPT

## 2017-05-23 PROCEDURE — 72170 X-RAY EXAM OF PELVIS: CPT

## 2017-05-23 PROCEDURE — 25010000002 TDAP 5-2.5-18.5 LF-MCG/0.5 SUSPENSION: Performed by: EMERGENCY MEDICINE

## 2017-05-23 PROCEDURE — 90715 TDAP VACCINE 7 YRS/> IM: CPT | Performed by: EMERGENCY MEDICINE

## 2017-05-23 PROCEDURE — 70450 CT HEAD/BRAIN W/O DYE: CPT

## 2017-05-23 PROCEDURE — 99284 EMERGENCY DEPT VISIT MOD MDM: CPT

## 2017-05-23 PROCEDURE — 85025 COMPLETE CBC W/AUTO DIFF WBC: CPT | Performed by: EMERGENCY MEDICINE

## 2017-05-23 PROCEDURE — 90471 IMMUNIZATION ADMIN: CPT | Performed by: EMERGENCY MEDICINE

## 2017-05-23 PROCEDURE — 71010 HC CHEST PA OR AP: CPT

## 2017-05-23 RX ORDER — ATORVASTATIN CALCIUM 80 MG/1
80 TABLET, FILM COATED ORAL DAILY
COMMUNITY
End: 2017-05-24 | Stop reason: SDUPTHER

## 2017-05-23 RX ORDER — LIDOCAINE HYDROCHLORIDE 10 MG/ML
10 INJECTION, SOLUTION INFILTRATION; PERINEURAL ONCE
Status: COMPLETED | OUTPATIENT
Start: 2017-05-23 | End: 2017-05-23

## 2017-05-23 RX ORDER — ASPIRIN 81 MG/1
81 TABLET, CHEWABLE ORAL DAILY
COMMUNITY
End: 2017-05-24 | Stop reason: SDUPTHER

## 2017-05-23 RX ORDER — PANTOPRAZOLE SODIUM 40 MG/1
40 TABLET, DELAYED RELEASE ORAL DAILY
COMMUNITY
End: 2017-05-24 | Stop reason: SDUPTHER

## 2017-05-23 RX ORDER — DILTIAZEM HYDROCHLORIDE 120 MG/1
120 CAPSULE, EXTENDED RELEASE ORAL DAILY
COMMUNITY
End: 2017-05-24 | Stop reason: SDUPTHER

## 2017-05-23 RX ORDER — LIDOCAINE HYDROCHLORIDE 10 MG/ML
INJECTION, SOLUTION INFILTRATION; PERINEURAL
Status: COMPLETED
Start: 2017-05-23 | End: 2017-05-23

## 2017-05-23 RX ADMIN — LIDOCAINE HYDROCHLORIDE 10 ML: 10 INJECTION, SOLUTION INFILTRATION; PERINEURAL at 11:02

## 2017-05-23 RX ADMIN — TETANUS TOXOID, REDUCED DIPHTHERIA TOXOID AND ACELLULAR PERTUSSIS VACCINE, ADSORBED 0.5 ML: 5; 2.5; 8; 8; 2.5 SUSPENSION INTRAMUSCULAR at 09:40

## 2017-05-24 ENCOUNTER — TELEPHONE (OUTPATIENT)
Dept: OTOLARYNGOLOGY | Facility: CLINIC | Age: 82
End: 2017-05-24

## 2017-05-24 RX ORDER — AMOXICILLIN AND CLAVULANATE POTASSIUM 875; 125 MG/1; MG/1
1 TABLET, FILM COATED ORAL EVERY 12 HOURS
Qty: 20 TABLET | Refills: 0 | Status: SHIPPED | OUTPATIENT
Start: 2017-05-24 | End: 2017-05-31 | Stop reason: SDUPTHER

## 2017-05-24 RX ORDER — MECLIZINE HYDROCHLORIDE 25 MG/1
25 TABLET ORAL 3 TIMES DAILY PRN
Qty: 60 TABLET | Refills: 3 | Status: SHIPPED | OUTPATIENT
Start: 2017-05-24 | End: 2017-06-23

## 2017-05-31 ENCOUNTER — OFFICE VISIT (OUTPATIENT)
Dept: OTOLARYNGOLOGY | Facility: CLINIC | Age: 82
End: 2017-05-31

## 2017-05-31 VITALS
HEART RATE: 57 BPM | HEIGHT: 72 IN | TEMPERATURE: 98.7 F | SYSTOLIC BLOOD PRESSURE: 107 MMHG | BODY MASS INDEX: 19.5 KG/M2 | DIASTOLIC BLOOD PRESSURE: 56 MMHG | WEIGHT: 144 LBS

## 2017-05-31 DIAGNOSIS — Z86.73 HX-TIA (TRANSIENT ISCHEMIC ATTACK): ICD-10-CM

## 2017-05-31 DIAGNOSIS — I48.91 ATRIAL FIBRILLATION, UNSPECIFIED TYPE (HCC): ICD-10-CM

## 2017-05-31 DIAGNOSIS — R26.89 IMBALANCE: ICD-10-CM

## 2017-05-31 DIAGNOSIS — J32.9 CHRONIC SINUSITIS, UNSPECIFIED LOCATION: Primary | ICD-10-CM

## 2017-05-31 DIAGNOSIS — Z79.01 ANTICOAGULATED: ICD-10-CM

## 2017-05-31 PROCEDURE — 99213 OFFICE O/P EST LOW 20 MIN: CPT | Performed by: OTOLARYNGOLOGY

## 2017-05-31 RX ORDER — FLUTICASONE PROPIONATE 50 MCG
2 SPRAY, SUSPENSION (ML) NASAL DAILY
Qty: 1 EACH | Refills: 0 | Status: SHIPPED | OUTPATIENT
Start: 2017-05-31 | End: 2017-06-20

## 2017-05-31 RX ORDER — AMOXICILLIN AND CLAVULANATE POTASSIUM 875; 125 MG/1; MG/1
1 TABLET, FILM COATED ORAL EVERY 12 HOURS
Qty: 20 TABLET | Refills: 0 | Status: SHIPPED | OUTPATIENT
Start: 2017-05-31 | End: 2017-06-10

## 2017-06-13 ENCOUNTER — TELEPHONE (OUTPATIENT)
Dept: OTOLARYNGOLOGY | Facility: CLINIC | Age: 82
End: 2017-06-13

## 2017-06-13 NOTE — TELEPHONE ENCOUNTER
Called and talked to Patients wife. Informed them that we have set an appointment for him at [facility] on Friday 16 th, 2017 at 11:00 am

## 2017-06-20 ENCOUNTER — OFFICE VISIT (OUTPATIENT)
Dept: NEUROLOGY | Facility: CLINIC | Age: 82
End: 2017-06-20

## 2017-06-20 VITALS
HEART RATE: 68 BPM | HEIGHT: 72 IN | DIASTOLIC BLOOD PRESSURE: 72 MMHG | BODY MASS INDEX: 20.05 KG/M2 | WEIGHT: 148 LBS | SYSTOLIC BLOOD PRESSURE: 118 MMHG

## 2017-06-20 DIAGNOSIS — R26.89 IMBALANCE: Primary | ICD-10-CM

## 2017-06-20 DIAGNOSIS — E78.2 MIXED HYPERLIPIDEMIA: ICD-10-CM

## 2017-06-20 DIAGNOSIS — I48.91 ATRIAL FIBRILLATION, UNSPECIFIED TYPE (HCC): ICD-10-CM

## 2017-06-20 DIAGNOSIS — G45.1 HEMISPHERIC CAROTID ARTERY SYNDROME: ICD-10-CM

## 2017-06-20 DIAGNOSIS — I35.0 NONRHEUMATIC AORTIC VALVE STENOSIS: ICD-10-CM

## 2017-06-20 DIAGNOSIS — Z79.01 ANTICOAGULATED: ICD-10-CM

## 2017-06-20 PROCEDURE — 99214 OFFICE O/P EST MOD 30 MIN: CPT | Performed by: CLINICAL NURSE SPECIALIST

## 2017-06-20 NOTE — PATIENT INSTRUCTIONS
Stroke Prevention  Some medical conditions and behaviors are associated with an increased chance of having a stroke. You may prevent a stroke by making healthy choices and managing medical conditions.  HOW CAN I REDUCE MY RISK OF HAVING A STROKE?   · Stay physically active. Get at least 30 minutes of activity on most or all days.  · Do not smoke. It may also be helpful to avoid exposure to secondhand smoke.  · Limit alcohol use. Moderate alcohol use is considered to be:  ¨ No more than 2 drinks per day for men.  ¨ No more than 1 drink per day for nonpregnant women.  · Eat healthy foods. This involves:  ¨ Eating 5 or more servings of fruits and vegetables a day.  ¨ Making dietary changes that address high blood pressure (hypertension), high cholesterol, diabetes, or obesity.  · Manage your cholesterol levels.  ¨ Making food choices that are high in fiber and low in saturated fat, trans fat, and cholesterol may control cholesterol levels.  ¨ Take any prescribed medicines to control cholesterol as directed by your health care provider.  · Manage your diabetes.  ¨ Controlling your carbohydrate and sugar intake is recommended to manage diabetes.  ¨ Take any prescribed medicines to control diabetes as directed by your health care provider.  · Control your hypertension.  ¨ Making food choices that are low in salt (sodium), saturated fat, trans fat, and cholesterol is recommended to manage hypertension.  ¨ Ask your health care provider if you need treatment to lower your blood pressure. Take any prescribed medicines to control hypertension as directed by your health care provider.  ¨ If you are 18-39 years of age, have your blood pressure checked every 3-5 years. If you are 40 years of age or older, have your blood pressure checked every year.  · Maintain a healthy weight.  ¨ Reducing calorie intake and making food choices that are low in sodium, saturated fat, trans fat, and cholesterol are recommended to manage  weight.  · Stop drug abuse.  · Avoid taking birth control pills.  ¨ Talk to your health care provider about the risks of taking birth control pills if you are over 35 years old, smoke, get migraines, or have ever had a blood clot.  · Get evaluated for sleep disorders (sleep apnea).  ¨ Talk to your health care provider about getting a sleep evaluation if you snore a lot or have excessive sleepiness.  · Take medicines only as directed by your health care provider.  ¨ For some people, aspirin or blood thinners (anticoagulants) are helpful in reducing the risk of forming abnormal blood clots that can lead to stroke. If you have the irregular heart rhythm of atrial fibrillation, you should be on a blood thinner unless there is a good reason you cannot take them.  ¨ Understand all your medicine instructions.  · Make sure that other conditions (such as anemia or atherosclerosis) are addressed.  SEEK IMMEDIATE MEDICAL CARE IF:   · You have sudden weakness or numbness of the face, arm, or leg, especially on one side of the body.  · Your face or eyelid droops to one side.  · You have sudden confusion.  · You have trouble speaking (aphasia) or understanding.  · You have sudden trouble seeing in one or both eyes.  · You have sudden trouble walking.  · You have dizziness.  · You have a loss of balance or coordination.  · You have a sudden, severe headache with no known cause.  · You have new chest pain or an irregular heartbeat.  Any of these symptoms may represent a serious problem that is an emergency. Do not wait to see if the symptoms will go away. Get medical help at once. Call your local emergency services (911 in [place] Do not drive yourself to the hospital.     This information is not intended to replace advice given to you by your health care provider. Make sure you discuss any questions you have with your health care provider.     Document Released: 01/25/2006 Document Revised: 01/08/2016 Document Reviewed:  06/20/2014  Elsevier Interactive Patient Education ©2017 [employer]

## 2017-06-20 NOTE — PROGRESS NOTES
Subjective     Chief Complaint   Patient presents with   • Stroke     no new stroke symptoms. He continues to have some imbalance. He has had a few falls since d/c'd       Alejandro Gerber is a 83 y.o. male right handed retiree. He is accompanied by his son. He is here today for hospital follow up for TIA 4/2017. He had transient altered speech and left sided weakness both completely resolved. He was seen in consultation by Dr. Carreno. He does have hx of afib on Xarelto and denied missing doses. ASA 81 mg was added to his medication regmimen. He denies bleeding or bright red/black BM. He returned to his baseline. In 5/2017 he had worsening dizziness and imbalance. He did come to ED and CT head was done. He cannot have MRI secondary to cochlear implant. He has been treated for sinus infection and dizziness has resolved.    He has no new complaints today.     Stroke   This is a chronic problem. The current episode started more than 1 year ago (1ST STROKE SEVERAL YEARS AGO AND TIA 4/2017). The problem has been resolved. Pertinent negatives include no arthralgias, chest pain, fatigue, fever, headaches, myalgias, nausea, vomiting or weakness. Associated symptoms comments: IMPAIRED SPEECH AND LEFT SIDED WEAKNESS, BOTH RESOLVED AFATER 10 MINUTES. Exacerbated by: AFIB. Treatments tried: XARELTO, ASA, STATIN. The treatment provided significant relief.        Current Outpatient Prescriptions   Medication Sig Dispense Refill   • aspirin 81 MG EC tablet Take 1 tablet by mouth Daily.     • atorvastatin (LIPITOR) 80 MG tablet Take 1 tablet by mouth Daily. 30 tablet 0   • diltiaZEM CD (CARDIZEM CD) 120 MG 24 hr capsule Take 120 mg by mouth Daily.     • meclizine (ANTIVERT) 25 MG tablet Take 1 tablet by mouth 3 (Three) Times a Day As Needed for dizziness (vertigo) for up to 30 days. 60 tablet 3   • pantoprazole (PROTONIX) 40 MG EC tablet Take 1 tablet by mouth Daily. 30 tablet 0   • rivaroxaban (XARELTO) 20 MG tablet Take 20 mg by  mouth Daily.       No current facility-administered medications for this visit.        Past Medical History:   Diagnosis Date   • Allergic rhinitis    • Asthma    • Atrial fibrillation    • Cancer     PROSTATE   • Chronic anticoagulation    • Chronic sinusitis    • Coronary artery disease    • Eustachian tube dysfunction    • Hard of hearing    • Hyperlipidemia    • Hypertrophy of inferior nasal turbinate    • Irregular heart rate    • Prostate cancer    • Shingles     2 years ago   • Stroke        Past Surgical History:   Procedure Laterality Date   • CARDIAC SURGERY     • CARDIAC SURGERY      cath & cabg   • COCHLEAR IMPLANT REVISION      insertion   • COCHLEAR IMPLANT REVISION     • CORONARY ARTERY BYPASS GRAFT     • PROSTATE SURGERY     • PROSTATECTOMY         family history includes Alzheimer's disease in his father; No Known Problems in his mother.    Social History   Substance Use Topics   • Smoking status: Never Smoker   • Smokeless tobacco: Never Used   • Alcohol use No       Review of Systems   Constitutional: Negative.  Negative for fatigue and fever.   HENT: Negative for nosebleeds, rhinorrhea, sneezing and voice change. Facial swelling: LEFT COCHLEAR IMPLANT.    Eyes: Negative.  Negative for visual disturbance.   Respiratory: Negative.  Negative for shortness of breath.    Cardiovascular: Negative.  Negative for chest pain.   Gastrointestinal: Negative.  Negative for constipation, diarrhea, nausea and vomiting.   Endocrine: Negative.    Genitourinary: Negative.  Negative for dysuria and frequency.   Musculoskeletal: Negative for arthralgias, gait problem and myalgias.   Skin: Negative.    Allergic/Immunologic: Negative.    Neurological: Positive for dizziness. Negative for speech difficulty, weakness and headaches.   Hematological: Negative.  Negative for adenopathy.   Psychiatric/Behavioral: Negative.  Negative for agitation, confusion and hallucinations.   All other systems reviewed and are  "negative.      Objective     /72  Pulse 68  Ht 72\" (182.9 cm)  Wt 148 lb (67.1 kg)  BMI 20.07 kg/m2, Body mass index is 20.07 kg/(m^2).    Physical Exam   Constitutional: He is oriented to person, place, and time. He appears well-developed and well-nourished.   HENT:   Head: Normocephalic and atraumatic.   Right Ear: External ear normal.   Left Ear: External ear normal.   Nose: Nose normal.   Mouth/Throat: Oropharynx is clear and moist.   Eyes: Conjunctivae, EOM and lids are normal. Pupils are equal, round, and reactive to light. Right eye exhibits no nystagmus. Left eye exhibits no nystagmus.   Neck: Normal range of motion. Neck supple. Carotid bruit is not present.   Cardiovascular: Normal rate, regular rhythm, S2 normal and normal heart sounds.    Pulmonary/Chest: Effort normal and breath sounds normal.   Abdominal: Soft. Bowel sounds are normal.   Musculoskeletal: Normal range of motion.   Neurological: He is alert and oriented to person, place, and time. He has normal strength and normal reflexes. He displays no tremor. No cranial nerve deficit or sensory deficit. He exhibits normal muscle tone. He displays a negative Romberg sign. Coordination and gait normal. GCS eye subscore is 4. GCS verbal subscore is 5. GCS motor subscore is 6.   Skin: Skin is warm and dry.   Psychiatric: He has a normal mood and affect. His speech is normal and behavior is normal. Cognition and memory are normal.   Nursing note and vitals reviewed.      Results for orders placed or performed during the hospital encounter of 05/23/17   Comprehensive Metabolic Panel   Result Value Ref Range    Glucose 99 70 - 100 mg/dL    BUN 22 (H) 5 - 21 mg/dL    Creatinine 1.01 0.50 - 1.40 mg/dL    Sodium 140 135 - 145 mmol/L    Potassium 4.4 3.5 - 5.3 mmol/L    Chloride 102 98 - 110 mmol/L    CO2 28.0 24.0 - 31.0 mmol/L    Calcium 8.9 8.4 - 10.4 mg/dL    Total Protein 6.7 6.3 - 8.7 g/dL    Albumin 3.60 3.50 - 5.00 g/dL    ALT (SGPT) 34 0 - 54 " U/L    AST (SGOT) 36 7 - 45 U/L    Alkaline Phosphatase 83 24 - 120 U/L    Total Bilirubin 1.1 (H) 0.1 - 1.0 mg/dL    eGFR Non African Amer 71 >60 mL/min/1.73    Globulin 3.1 gm/dL    A/G Ratio 1.2 1.1 - 2.5 g/dL    BUN/Creatinine Ratio 21.8 7.0 - 25.0    Anion Gap 10.0 4.0 - 13.0 mmol/L   CBC Auto Differential   Result Value Ref Range    WBC 6.67 4.80 - 10.80 10*3/mm3    RBC 3.91 (L) 4.80 - 5.90 10*6/mm3    Hemoglobin 11.6 (L) 14.0 - 18.0 g/dL    Hematocrit 34.3 (L) 40.0 - 52.0 %    MCV 87.7 82.0 - 95.0 fL    MCH 29.7 28.0 - 32.0 pg    MCHC 33.8 33.0 - 36.0 g/dL    RDW 14.3 12.0 - 15.0 %    RDW-SD 45.6 40.0 - 54.0 fl    MPV 9.6 6.0 - 12.0 fL    Platelets 182 130 - 400 10*3/mm3    Neutrophil % 64.7 39.0 - 78.0 %    Lymphocyte % 26.7 15.0 - 45.0 %    Monocyte % 7.2 4.0 - 12.0 %    Eosinophil % 0.7 0.0 - 4.0 %    Basophil % 0.4 0.0 - 2.0 %    Immature Grans % 0.3 0.0 - 5.0 %    Neutrophils, Absolute 4.31 1.87 - 8.40 10*3/mm3    Lymphocytes, Absolute 1.78 0.72 - 4.86 10*3/mm3    Monocytes, Absolute 0.48 0.19 - 1.30 10*3/mm3    Eosinophils, Absolute 0.05 0.00 - 0.70 10*3/mm3    Basophils, Absolute 0.03 0.00 - 0.20 10*3/mm3    Immature Grans, Absolute 0.02 0.00 - 0.03 10*3/mm3      CT HEAD 5/2017IMPRESSION:  Moderate cerebral and cerebellar atrophy with chronic microvascular  disease but no evidence of acute intracranial process.        CT HEAD 4/2017:IMPRESSION:  1.. Chronic-appearing right-sided sinus disease including involvement of  the frontal sinus and middle and anterior ethmoid air cells, as well as  the right maxillary sinus. There is right-sided infundibular expansion  with some soft tissue density extending into the nasal cavity. ENT  consultation will be recommended for further assessment. 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   2. Diffuse atrophy with moderate small vessel ischemic disease.  This report was finalized on 04/17/2017 21:10 by Dr. Darek Mcgrath MD.    CAROTID  DUPLEX:IMPRESSION:  Impression:  1. There is less than 50% stenosis of the right internal carotid artery.  2. There is less than 50% stenosis of the left internal carotid artery.  3. Antegrade flow is demonstrated in bilateral vertebral arteries.      2DECHO:Interpretation Summary   · Left ventricular wall thickness is consistent with mild concentric hypertrophy.  · Left ventricular function is normal. Estimated EF = 60%.  · Left ventricular diastolic dysfunction (grade I) consistent with impaired relaxation.  · Mild aortic valve stenosis is present.  · Moderate pulmonary hypertension is present.         ASSESSMENT/PLAN    Diagnoses and all orders for this visit:    Imbalance    Anticoagulated    Mixed hyperlipidemia    Nonrheumatic aortic valve stenosis    Hemispheric carotid artery syndrome    Atrial fibrillation, unspecified type    MEDICAL DECISION MAKIN. continue with ASA 81 mg and Xaretlo. CT head negative for stroke 17  2. Continue with statin per PCP. Even if LDL is low end of normal will benefit from low dose statin for neuro protective properties.  3. afib managed by PCP and cardiology.  4. Health BMI  5. Patient does not use tobacco.  6. Patient is counseled on stroke signs and symptoms using FAST and Time Saved is Brain Saved.     allergies and all known medications/prescriptions have been reviewed using resources available on this encounter.    Return in about 6 months (around 2017).        RU Santos

## 2018-01-01 ENCOUNTER — LAB REQUISITION (OUTPATIENT)
Dept: LAB | Facility: HOSPITAL | Age: 83
End: 2018-01-01

## 2018-01-01 ENCOUNTER — APPOINTMENT (OUTPATIENT)
Dept: GENERAL RADIOLOGY | Facility: HOSPITAL | Age: 83
End: 2018-01-01

## 2018-01-01 ENCOUNTER — APPOINTMENT (OUTPATIENT)
Dept: CT IMAGING | Facility: HOSPITAL | Age: 83
End: 2018-01-01

## 2018-01-01 ENCOUNTER — OFFICE VISIT (OUTPATIENT)
Dept: CARDIOLOGY | Facility: CLINIC | Age: 83
End: 2018-01-01

## 2018-01-01 ENCOUNTER — APPOINTMENT (OUTPATIENT)
Dept: CARDIOLOGY | Facility: HOSPITAL | Age: 83
End: 2018-01-01
Attending: INTERNAL MEDICINE

## 2018-01-01 ENCOUNTER — TELEPHONE (OUTPATIENT)
Dept: VASCULAR SURGERY | Facility: CLINIC | Age: 83
End: 2018-01-01

## 2018-01-01 ENCOUNTER — HOSPITAL ENCOUNTER (INPATIENT)
Facility: HOSPITAL | Age: 83
LOS: 2 days | End: 2018-08-05
Attending: EMERGENCY MEDICINE | Admitting: FAMILY MEDICINE

## 2018-01-01 ENCOUNTER — HOSPITAL ENCOUNTER (INPATIENT)
Facility: HOSPITAL | Age: 83
LOS: 7 days | Discharge: NURSING, REHAB OR LONG-TERM CARE | End: 2018-07-02
Attending: EMERGENCY MEDICINE | Admitting: FAMILY MEDICINE

## 2018-01-01 ENCOUNTER — APPOINTMENT (OUTPATIENT)
Dept: CARDIOLOGY | Facility: HOSPITAL | Age: 83
End: 2018-01-01
Attending: FAMILY MEDICINE

## 2018-01-01 ENCOUNTER — ANESTHESIA EVENT (OUTPATIENT)
Dept: GASTROENTEROLOGY | Facility: HOSPITAL | Age: 83
End: 2018-01-01

## 2018-01-01 ENCOUNTER — ANESTHESIA (OUTPATIENT)
Dept: GASTROENTEROLOGY | Facility: HOSPITAL | Age: 83
End: 2018-01-01

## 2018-01-01 VITALS
OXYGEN SATURATION: 75 % | DIASTOLIC BLOOD PRESSURE: 20 MMHG | WEIGHT: 135.6 LBS | HEIGHT: 67 IN | BODY MASS INDEX: 21.28 KG/M2 | SYSTOLIC BLOOD PRESSURE: 54 MMHG | TEMPERATURE: 98.2 F | RESPIRATION RATE: 44 BRPM

## 2018-01-01 VITALS
DIASTOLIC BLOOD PRESSURE: 76 MMHG | SYSTOLIC BLOOD PRESSURE: 122 MMHG | RESPIRATION RATE: 17 BRPM | BODY MASS INDEX: 21.56 KG/M2 | WEIGHT: 129.38 LBS | OXYGEN SATURATION: 98 % | TEMPERATURE: 97.2 F | HEART RATE: 95 BPM | HEIGHT: 65 IN

## 2018-01-01 VITALS
HEIGHT: 72 IN | WEIGHT: 137 LBS | OXYGEN SATURATION: 99 % | HEART RATE: 74 BPM | BODY MASS INDEX: 18.56 KG/M2 | SYSTOLIC BLOOD PRESSURE: 110 MMHG | DIASTOLIC BLOOD PRESSURE: 70 MMHG

## 2018-01-01 DIAGNOSIS — R06.03 RESPIRATORY DISTRESS: Primary | ICD-10-CM

## 2018-01-01 DIAGNOSIS — Z00.00 ENCOUNTER FOR GENERAL ADULT MEDICAL EXAMINATION WITHOUT ABNORMAL FINDINGS: ICD-10-CM

## 2018-01-01 DIAGNOSIS — D64.9 LOW HEMOGLOBIN: ICD-10-CM

## 2018-01-01 DIAGNOSIS — Z74.09 IMPAIRED MOBILITY AND ADLS: ICD-10-CM

## 2018-01-01 DIAGNOSIS — A41.9 SEPSIS, DUE TO UNSPECIFIED ORGANISM: ICD-10-CM

## 2018-01-01 DIAGNOSIS — I48.91 ATRIAL FIBRILLATION, UNSPECIFIED TYPE (HCC): Primary | ICD-10-CM

## 2018-01-01 DIAGNOSIS — G45.1 HEMISPHERIC CAROTID ARTERY SYNDROME: ICD-10-CM

## 2018-01-01 DIAGNOSIS — J18.9 HCAP (HEALTHCARE-ASSOCIATED PNEUMONIA): ICD-10-CM

## 2018-01-01 DIAGNOSIS — I35.0 NONRHEUMATIC AORTIC VALVE STENOSIS: ICD-10-CM

## 2018-01-01 DIAGNOSIS — R77.8 TROPONIN I ABOVE REFERENCE RANGE: ICD-10-CM

## 2018-01-01 DIAGNOSIS — I25.10 CORONARY ARTERY DISEASE INVOLVING NATIVE CORONARY ARTERY OF NATIVE HEART WITHOUT ANGINA PECTORIS: ICD-10-CM

## 2018-01-01 DIAGNOSIS — R13.12 OROPHARYNGEAL DYSPHAGIA: ICD-10-CM

## 2018-01-01 DIAGNOSIS — J18.9 PNEUMONIA OF BOTH LUNGS DUE TO INFECTIOUS ORGANISM, UNSPECIFIED PART OF LUNG: ICD-10-CM

## 2018-01-01 DIAGNOSIS — I50.9 CONGESTIVE HEART FAILURE, UNSPECIFIED CONGESTIVE HEART FAILURE CHRONICITY, UNSPECIFIED CONGESTIVE HEART FAILURE TYPE: ICD-10-CM

## 2018-01-01 DIAGNOSIS — E78.2 MIXED HYPERLIPIDEMIA: ICD-10-CM

## 2018-01-01 DIAGNOSIS — Z78.9 IMPAIRED MOBILITY AND ADLS: ICD-10-CM

## 2018-01-01 DIAGNOSIS — I63.9 CEREBROVASCULAR ACCIDENT (CVA), UNSPECIFIED MECHANISM (HCC): Primary | ICD-10-CM

## 2018-01-01 DIAGNOSIS — R62.7 FAILURE TO THRIVE IN ADULT: ICD-10-CM

## 2018-01-01 LAB
ABO GROUP BLD: NORMAL
ALBUMIN SERPL-MCNC: 2.7 G/DL (ref 3.5–5)
ALBUMIN SERPL-MCNC: 2.9 G/DL (ref 3.5–5)
ALBUMIN SERPL-MCNC: 3.1 G/DL (ref 3.5–5)
ALBUMIN SERPL-MCNC: 3.4 G/DL (ref 3.5–5)
ALBUMIN SERPL-MCNC: 3.8 G/DL (ref 3.5–5)
ALBUMIN/GLOB SERPL: 0.8 G/DL (ref 1.1–2.5)
ALBUMIN/GLOB SERPL: 0.8 G/DL (ref 1.1–2.5)
ALBUMIN/GLOB SERPL: 0.9 G/DL (ref 1.1–2.5)
ALBUMIN/GLOB SERPL: 0.9 G/DL (ref 1.1–2.5)
ALBUMIN/GLOB SERPL: 1 G/DL (ref 1.1–2.5)
ALP SERPL-CCNC: 112 U/L (ref 24–120)
ALP SERPL-CCNC: 117 U/L (ref 24–120)
ALP SERPL-CCNC: 127 U/L (ref 24–120)
ALP SERPL-CCNC: 74 U/L (ref 24–120)
ALP SERPL-CCNC: 93 U/L (ref 24–120)
ALT SERPL W P-5'-P-CCNC: 25 U/L (ref 0–54)
ALT SERPL W P-5'-P-CCNC: 61 U/L (ref 0–54)
ALT SERPL W P-5'-P-CCNC: 63 U/L (ref 0–54)
ALT SERPL W P-5'-P-CCNC: 75 U/L (ref 0–54)
ALT SERPL W P-5'-P-CCNC: 99 U/L (ref 0–54)
ANION GAP SERPL CALCULATED.3IONS-SCNC: 11 MMOL/L (ref 4–13)
ANION GAP SERPL CALCULATED.3IONS-SCNC: 12 MMOL/L (ref 4–13)
ANION GAP SERPL CALCULATED.3IONS-SCNC: 12 MMOL/L (ref 4–13)
ANION GAP SERPL CALCULATED.3IONS-SCNC: 13 MMOL/L (ref 4–13)
ANION GAP SERPL CALCULATED.3IONS-SCNC: 13 MMOL/L (ref 4–13)
ANION GAP SERPL CALCULATED.3IONS-SCNC: 14 MMOL/L (ref 4–13)
ANION GAP SERPL CALCULATED.3IONS-SCNC: 16 MMOL/L (ref 4–13)
ANION GAP SERPL CALCULATED.3IONS-SCNC: 16 MMOL/L (ref 4–13)
ANION GAP SERPL CALCULATED.3IONS-SCNC: 9 MMOL/L (ref 4–13)
APTT PPP: 40 SECONDS (ref 24.1–34.8)
APTT PPP: 41.7 SECONDS (ref 24.1–34.8)
APTT PPP: 42.8 SECONDS (ref 24.1–34.8)
APTT PPP: 44.4 SECONDS (ref 24.1–34.8)
APTT PPP: 59.3 SECONDS (ref 24.1–34.8)
ARTICHOKE IGE QN: 47 MG/DL (ref 0–99)
ARTICHOKE IGE QN: 74 MG/DL (ref 0–99)
AST SERPL-CCNC: 39 U/L (ref 7–45)
AST SERPL-CCNC: 51 U/L (ref 7–45)
AST SERPL-CCNC: 63 U/L (ref 7–45)
AST SERPL-CCNC: 71 U/L (ref 7–45)
AST SERPL-CCNC: 79 U/L (ref 7–45)
BASOPHILS # BLD AUTO: 0.02 10*3/MM3 (ref 0–0.2)
BASOPHILS # BLD AUTO: 0.04 10*3/MM3 (ref 0–0.2)
BASOPHILS # BLD AUTO: 0.07 10*3/MM3 (ref 0–0.2)
BASOPHILS NFR BLD AUTO: 0.1 % (ref 0–2)
BASOPHILS NFR BLD AUTO: 0.2 % (ref 0–2)
BASOPHILS NFR BLD AUTO: 0.4 % (ref 0–2)
BASOPHILS NFR BLD AUTO: 0.7 % (ref 0–2)
BH CV ECHO MEAS - AO MAX PG (FULL): 22.9 MMHG
BH CV ECHO MEAS - AO MAX PG: 25 MMHG
BH CV ECHO MEAS - AO MEAN PG (FULL): 5 MMHG
BH CV ECHO MEAS - AO MEAN PG: 6 MMHG
BH CV ECHO MEAS - AO ROOT AREA (BSA CORRECTED): 2.5
BH CV ECHO MEAS - AO ROOT AREA: 13.9 CM^2
BH CV ECHO MEAS - AO ROOT DIAM: 4.2 CM
BH CV ECHO MEAS - AO V2 MAX: 250 CM/SEC
BH CV ECHO MEAS - AO V2 MEAN: 110 CM/SEC
BH CV ECHO MEAS - AO V2 VTI: 39.1 CM
BH CV ECHO MEAS - AVA(I,A): 1.2 CM^2
BH CV ECHO MEAS - AVA(I,D): 1.2 CM^2
BH CV ECHO MEAS - AVA(V,A): 0.91 CM^2
BH CV ECHO MEAS - AVA(V,D): 0.91 CM^2
BH CV ECHO MEAS - BSA(HAYCOCK): 1.6 M^2
BH CV ECHO MEAS - BSA: 1.6 M^2
BH CV ECHO MEAS - BZI_BMI: 21.6 KILOGRAMS/M^2
BH CV ECHO MEAS - BZI_METRIC_HEIGHT: 165.1 CM
BH CV ECHO MEAS - BZI_METRIC_WEIGHT: 59 KG
BH CV ECHO MEAS - CONTRAST EF 4CH: 60.8 ML/M^2
BH CV ECHO MEAS - EDV(CUBED): 78.4 ML
BH CV ECHO MEAS - EDV(MOD-SP4): 73 ML
BH CV ECHO MEAS - EDV(TEICH): 82.2 ML
BH CV ECHO MEAS - EF(CUBED): 72.3 %
BH CV ECHO MEAS - EF(MOD-SP4): 60.8 %
BH CV ECHO MEAS - EF(TEICH): 64.3 %
BH CV ECHO MEAS - ESV(CUBED): 21.7 ML
BH CV ECHO MEAS - ESV(MOD-SP4): 28.6 ML
BH CV ECHO MEAS - ESV(TEICH): 29.3 ML
BH CV ECHO MEAS - FS: 34.8 %
BH CV ECHO MEAS - IVS/LVPW: 1
BH CV ECHO MEAS - IVSD: 1.1 CM
BH CV ECHO MEAS - LA DIMENSION: 4.8 CM
BH CV ECHO MEAS - LA/AO: 1.1
BH CV ECHO MEAS - LAT PEAK E' VEL: 6.3 CM/SEC
BH CV ECHO MEAS - LV DIASTOLIC VOL/BSA (35-75): 44.3 ML/M^2
BH CV ECHO MEAS - LV MASS(C)D: 152.5 GRAMS
BH CV ECHO MEAS - LV MASS(C)DI: 92.6 GRAMS/M^2
BH CV ECHO MEAS - LV MAX PG: 2.1 MMHG
BH CV ECHO MEAS - LV MEAN PG: 1 MMHG
BH CV ECHO MEAS - LV SYSTOLIC VOL/BSA (12-30): 17.4 ML/M^2
BH CV ECHO MEAS - LV V1 MAX: 72.4 CM/SEC
BH CV ECHO MEAS - LV V1 MEAN: 45 CM/SEC
BH CV ECHO MEAS - LV V1 VTI: 15.1 CM
BH CV ECHO MEAS - LVIDD: 4.3 CM
BH CV ECHO MEAS - LVIDS: 2.8 CM
BH CV ECHO MEAS - LVLD AP4: 6.5 CM
BH CV ECHO MEAS - LVLS AP4: 5.7 CM
BH CV ECHO MEAS - LVOT AREA (M): 3.1 CM^2
BH CV ECHO MEAS - LVOT AREA: 3.1 CM^2
BH CV ECHO MEAS - LVOT DIAM: 2 CM
BH CV ECHO MEAS - LVPWD: 1.1 CM
BH CV ECHO MEAS - MV A MAX VEL: 28.1 CM/SEC
BH CV ECHO MEAS - MV DEC TIME: 0.17 SEC
BH CV ECHO MEAS - MV E MAX VEL: 97.5 CM/SEC
BH CV ECHO MEAS - MV E/A: 3.5
BH CV ECHO MEAS - RAP SYSTOLE: 5 MMHG
BH CV ECHO MEAS - RVSP: 35 MMHG
BH CV ECHO MEAS - SI(AO): 328.9 ML/M^2
BH CV ECHO MEAS - SI(CUBED): 34.4 ML/M^2
BH CV ECHO MEAS - SI(LVOT): 28.8 ML/M^2
BH CV ECHO MEAS - SI(MOD-SP4): 27 ML/M^2
BH CV ECHO MEAS - SI(TEICH): 32.1 ML/M^2
BH CV ECHO MEAS - SV(AO): 541.7 ML
BH CV ECHO MEAS - SV(CUBED): 56.7 ML
BH CV ECHO MEAS - SV(LVOT): 47.4 ML
BH CV ECHO MEAS - SV(MOD-SP4): 44.4 ML
BH CV ECHO MEAS - SV(TEICH): 52.9 ML
BH CV ECHO MEAS - TR MAX VEL: 274 CM/SEC
BILIRUB SERPL-MCNC: 0.5 MG/DL (ref 0.1–1)
BILIRUB SERPL-MCNC: 0.6 MG/DL (ref 0.1–1)
BILIRUB SERPL-MCNC: 1.1 MG/DL (ref 0.1–1)
BILIRUB SERPL-MCNC: 1.2 MG/DL (ref 0.1–1)
BILIRUB SERPL-MCNC: 1.5 MG/DL (ref 0.1–1)
BLD GP AB SCN SERPL QL: NEGATIVE
BUN BLD-MCNC: 14 MG/DL (ref 5–21)
BUN BLD-MCNC: 15 MG/DL (ref 5–21)
BUN BLD-MCNC: 16 MG/DL (ref 5–21)
BUN BLD-MCNC: 18 MG/DL (ref 5–21)
BUN BLD-MCNC: 19 MG/DL (ref 5–21)
BUN BLD-MCNC: 20 MG/DL (ref 5–21)
BUN BLD-MCNC: 21 MG/DL (ref 5–21)
BUN BLD-MCNC: 23 MG/DL (ref 5–21)
BUN BLD-MCNC: 24 MG/DL (ref 5–21)
BUN BLD-MCNC: 26 MG/DL (ref 5–21)
BUN BLD-MCNC: 31 MG/DL (ref 5–21)
BUN BLD-MCNC: 32 MG/DL (ref 5–21)
BUN BLD-MCNC: 53 MG/DL (ref 5–21)
BUN/CREAT SERPL: 20.3 (ref 7–25)
BUN/CREAT SERPL: 21.7 (ref 7–25)
BUN/CREAT SERPL: 21.9 (ref 7–25)
BUN/CREAT SERPL: 23.5 (ref 7–25)
BUN/CREAT SERPL: 24.1 (ref 7–25)
BUN/CREAT SERPL: 26.4 (ref 7–25)
BUN/CREAT SERPL: 26.6 (ref 7–25)
BUN/CREAT SERPL: 28.6 (ref 7–25)
BUN/CREAT SERPL: 37.1 (ref 7–25)
BUN/CREAT SERPL: 38.3 (ref 7–25)
BUN/CREAT SERPL: 43.1 (ref 7–25)
BUN/CREAT SERPL: 43.2 (ref 7–25)
BUN/CREAT SERPL: 55.8 (ref 7–25)
CALCIUM SPEC-SCNC: 8 MG/DL (ref 8.4–10.4)
CALCIUM SPEC-SCNC: 8.1 MG/DL (ref 8.4–10.4)
CALCIUM SPEC-SCNC: 8.2 MG/DL (ref 8.4–10.4)
CALCIUM SPEC-SCNC: 8.3 MG/DL (ref 8.4–10.4)
CALCIUM SPEC-SCNC: 8.4 MG/DL (ref 8.4–10.4)
CALCIUM SPEC-SCNC: 8.4 MG/DL (ref 8.4–10.4)
CALCIUM SPEC-SCNC: 8.5 MG/DL (ref 8.4–10.4)
CALCIUM SPEC-SCNC: 8.5 MG/DL (ref 8.4–10.4)
CALCIUM SPEC-SCNC: 8.7 MG/DL (ref 8.4–10.4)
CALCIUM SPEC-SCNC: 8.8 MG/DL (ref 8.4–10.4)
CALCIUM SPEC-SCNC: 8.9 MG/DL (ref 8.4–10.4)
CALCIUM SPEC-SCNC: 8.9 MG/DL (ref 8.4–10.4)
CALCIUM SPEC-SCNC: 9 MG/DL (ref 8.4–10.4)
CHLORIDE SERPL-SCNC: 101 MMOL/L (ref 98–110)
CHLORIDE SERPL-SCNC: 104 MMOL/L (ref 98–110)
CHLORIDE SERPL-SCNC: 104 MMOL/L (ref 98–110)
CHLORIDE SERPL-SCNC: 105 MMOL/L (ref 98–110)
CHLORIDE SERPL-SCNC: 92 MMOL/L (ref 98–110)
CHLORIDE SERPL-SCNC: 92 MMOL/L (ref 98–110)
CHLORIDE SERPL-SCNC: 93 MMOL/L (ref 98–110)
CHLORIDE SERPL-SCNC: 95 MMOL/L (ref 98–110)
CHLORIDE SERPL-SCNC: 96 MMOL/L (ref 98–110)
CHLORIDE SERPL-SCNC: 96 MMOL/L (ref 98–110)
CHLORIDE SERPL-SCNC: 97 MMOL/L (ref 98–110)
CHLORIDE SERPL-SCNC: 99 MMOL/L (ref 98–110)
CHLORIDE SERPL-SCNC: 99 MMOL/L (ref 98–110)
CHOLEST SERPL-MCNC: 142 MG/DL (ref 130–200)
CHOLEST SERPL-MCNC: 93 MG/DL (ref 130–200)
CO2 SERPL-SCNC: 19 MMOL/L (ref 24–31)
CO2 SERPL-SCNC: 20 MMOL/L (ref 24–31)
CO2 SERPL-SCNC: 23 MMOL/L (ref 24–31)
CO2 SERPL-SCNC: 23 MMOL/L (ref 24–31)
CO2 SERPL-SCNC: 24 MMOL/L (ref 24–31)
CO2 SERPL-SCNC: 26 MMOL/L (ref 24–31)
CO2 SERPL-SCNC: 27 MMOL/L (ref 24–31)
CO2 SERPL-SCNC: 27 MMOL/L (ref 24–31)
CO2 SERPL-SCNC: 28 MMOL/L (ref 24–31)
CO2 SERPL-SCNC: 29 MMOL/L (ref 24–31)
CREAT BLD-MCNC: 0.6 MG/DL (ref 0.5–1.4)
CREAT BLD-MCNC: 0.63 MG/DL (ref 0.5–1.4)
CREAT BLD-MCNC: 0.69 MG/DL (ref 0.5–1.4)
CREAT BLD-MCNC: 0.69 MG/DL (ref 0.5–1.4)
CREAT BLD-MCNC: 0.7 MG/DL (ref 0.5–1.4)
CREAT BLD-MCNC: 0.72 MG/DL (ref 0.5–1.4)
CREAT BLD-MCNC: 0.73 MG/DL (ref 0.5–1.4)
CREAT BLD-MCNC: 0.74 MG/DL (ref 0.5–1.4)
CREAT BLD-MCNC: 0.79 MG/DL (ref 0.5–1.4)
CREAT BLD-MCNC: 0.79 MG/DL (ref 0.5–1.4)
CREAT BLD-MCNC: 0.85 MG/DL (ref 0.5–1.4)
CREAT BLD-MCNC: 0.91 MG/DL (ref 0.5–1.4)
CREAT BLD-MCNC: 0.95 MG/DL (ref 0.5–1.4)
DEPRECATED RDW RBC AUTO: 43.8 FL (ref 40–54)
DEPRECATED RDW RBC AUTO: 43.8 FL (ref 40–54)
DEPRECATED RDW RBC AUTO: 44.2 FL (ref 40–54)
DEPRECATED RDW RBC AUTO: 44.7 FL (ref 40–54)
DEPRECATED RDW RBC AUTO: 47.2 FL (ref 40–54)
DEPRECATED RDW RBC AUTO: 47.6 FL (ref 40–54)
DEPRECATED RDW RBC AUTO: 49.8 FL (ref 40–54)
DEPRECATED RDW RBC AUTO: 51.2 FL (ref 40–54)
EOSINOPHIL # BLD AUTO: 0.01 10*3/MM3 (ref 0–0.7)
EOSINOPHIL # BLD AUTO: 0.02 10*3/MM3 (ref 0–0.7)
EOSINOPHIL # BLD AUTO: 0.08 10*3/MM3 (ref 0–0.7)
EOSINOPHIL # BLD AUTO: 0.24 10*3/MM3 (ref 0–0.7)
EOSINOPHIL NFR BLD AUTO: 0.1 % (ref 0–4)
EOSINOPHIL NFR BLD AUTO: 0.2 % (ref 0–4)
EOSINOPHIL NFR BLD AUTO: 0.7 % (ref 0–4)
EOSINOPHIL NFR BLD AUTO: 2.4 % (ref 0–4)
ERYTHROCYTE [DISTWIDTH] IN BLOOD BY AUTOMATED COUNT: 13.7 % (ref 12–15)
ERYTHROCYTE [DISTWIDTH] IN BLOOD BY AUTOMATED COUNT: 13.8 % (ref 12–15)
ERYTHROCYTE [DISTWIDTH] IN BLOOD BY AUTOMATED COUNT: 13.9 % (ref 12–15)
ERYTHROCYTE [DISTWIDTH] IN BLOOD BY AUTOMATED COUNT: 13.9 % (ref 12–15)
ERYTHROCYTE [DISTWIDTH] IN BLOOD BY AUTOMATED COUNT: 14.8 % (ref 12–15)
ERYTHROCYTE [DISTWIDTH] IN BLOOD BY AUTOMATED COUNT: 14.8 % (ref 12–15)
ERYTHROCYTE [DISTWIDTH] IN BLOOD BY AUTOMATED COUNT: 15.1 % (ref 12–15)
ERYTHROCYTE [DISTWIDTH] IN BLOOD BY AUTOMATED COUNT: 15.9 % (ref 12–15)
ERYTHROCYTE [SEDIMENTATION RATE] IN BLOOD: 79 MM/HR (ref 0–15)
FERRITIN SERPL-MCNC: 218 NG/ML (ref 17.9–464)
FOLATE SERPL-MCNC: 19.6 NG/ML
GFR SERPL CREATININE-BSD FRML MDRD: 101 ML/MIN/1.73
GFR SERPL CREATININE-BSD FRML MDRD: 102 ML/MIN/1.73
GFR SERPL CREATININE-BSD FRML MDRD: 104 ML/MIN/1.73
GFR SERPL CREATININE-BSD FRML MDRD: 107 ML/MIN/1.73
GFR SERPL CREATININE-BSD FRML MDRD: 109 ML/MIN/1.73
GFR SERPL CREATININE-BSD FRML MDRD: 109 ML/MIN/1.73
GFR SERPL CREATININE-BSD FRML MDRD: 121 ML/MIN/1.73
GFR SERPL CREATININE-BSD FRML MDRD: 128 ML/MIN/1.73
GFR SERPL CREATININE-BSD FRML MDRD: 76 ML/MIN/1.73
GFR SERPL CREATININE-BSD FRML MDRD: 79 ML/MIN/1.73
GFR SERPL CREATININE-BSD FRML MDRD: 86 ML/MIN/1.73
GFR SERPL CREATININE-BSD FRML MDRD: 93 ML/MIN/1.73
GFR SERPL CREATININE-BSD FRML MDRD: 93 ML/MIN/1.73
GLOBULIN UR ELPH-MCNC: 3.2 GM/DL
GLOBULIN UR ELPH-MCNC: 3.3 GM/DL
GLOBULIN UR ELPH-MCNC: 3.5 GM/DL
GLOBULIN UR ELPH-MCNC: 3.8 GM/DL
GLOBULIN UR ELPH-MCNC: 3.8 GM/DL
GLUCOSE BLD-MCNC: 111 MG/DL (ref 70–100)
GLUCOSE BLD-MCNC: 115 MG/DL (ref 70–100)
GLUCOSE BLD-MCNC: 117 MG/DL (ref 70–100)
GLUCOSE BLD-MCNC: 120 MG/DL (ref 70–100)
GLUCOSE BLD-MCNC: 129 MG/DL (ref 70–100)
GLUCOSE BLD-MCNC: 133 MG/DL (ref 70–100)
GLUCOSE BLD-MCNC: 133 MG/DL (ref 70–100)
GLUCOSE BLD-MCNC: 66 MG/DL (ref 70–100)
GLUCOSE BLD-MCNC: 70 MG/DL (ref 70–100)
GLUCOSE BLD-MCNC: 76 MG/DL (ref 70–100)
GLUCOSE BLD-MCNC: 76 MG/DL (ref 70–100)
GLUCOSE BLD-MCNC: 95 MG/DL (ref 70–100)
GLUCOSE BLD-MCNC: 97 MG/DL (ref 70–100)
GLUCOSE BLDC GLUCOMTR-MCNC: 117 MG/DL (ref 70–130)
GLUCOSE BLDC GLUCOMTR-MCNC: 125 MG/DL (ref 70–130)
GLUCOSE BLDC GLUCOMTR-MCNC: 128 MG/DL (ref 70–130)
GLUCOSE BLDC GLUCOMTR-MCNC: 148 MG/DL (ref 70–130)
GLUCOSE BLDC GLUCOMTR-MCNC: 150 MG/DL (ref 70–130)
GLUCOSE BLDC GLUCOMTR-MCNC: 151 MG/DL (ref 70–130)
GLUCOSE BLDC GLUCOMTR-MCNC: 168 MG/DL (ref 70–130)
GLUCOSE BLDC GLUCOMTR-MCNC: 69 MG/DL (ref 70–130)
HBA1C MFR BLD: 5.7 %
HCT VFR BLD AUTO: 26.9 % (ref 40–52)
HCT VFR BLD AUTO: 29.2 % (ref 40–52)
HCT VFR BLD AUTO: 29.8 % (ref 40–52)
HCT VFR BLD AUTO: 29.9 % (ref 40–52)
HCT VFR BLD AUTO: 30.3 % (ref 40–52)
HCT VFR BLD AUTO: 30.4 % (ref 40–52)
HCT VFR BLD AUTO: 32.4 % (ref 40–52)
HCT VFR BLD AUTO: 35.2 % (ref 40–52)
HDLC SERPL-MCNC: 34 MG/DL
HDLC SERPL-MCNC: 36 MG/DL
HGB BLD-MCNC: 10.1 G/DL (ref 14–18)
HGB BLD-MCNC: 10.2 G/DL (ref 14–18)
HGB BLD-MCNC: 10.3 G/DL (ref 14–18)
HGB BLD-MCNC: 10.6 G/DL (ref 14–18)
HGB BLD-MCNC: 11.6 G/DL (ref 14–18)
HGB BLD-MCNC: 9.1 G/DL (ref 14–18)
HGB BLD-MCNC: 9.6 G/DL (ref 14–18)
HGB BLD-MCNC: 9.9 G/DL (ref 14–18)
HOLD SPECIMEN: NORMAL
HOLD SPECIMEN: NORMAL
IMM GRANULOCYTES # BLD: 0.05 10*3/MM3 (ref 0–0.03)
IMM GRANULOCYTES # BLD: 0.06 10*3/MM3 (ref 0–0.03)
IMM GRANULOCYTES # BLD: 0.07 10*3/MM3 (ref 0–0.03)
IMM GRANULOCYTES # BLD: 0.07 10*3/MM3 (ref 0–0.03)
IMM GRANULOCYTES # BLD: 0.08 10*3/MM3 (ref 0–0.03)
IMM GRANULOCYTES # BLD: 0.15 10*3/MM3 (ref 0–0.03)
IMM GRANULOCYTES NFR BLD: 0.6 % (ref 0–5)
IMM GRANULOCYTES NFR BLD: 0.6 % (ref 0–5)
IMM GRANULOCYTES NFR BLD: 0.7 % (ref 0–5)
IMM GRANULOCYTES NFR BLD: 0.8 % (ref 0–5)
INR PPP: 1.22 (ref 0.91–1.09)
INR PPP: 1.3 (ref 0.91–1.09)
INR PPP: 1.37 (ref 0.91–1.09)
INR PPP: 1.53 (ref 0.91–1.09)
INR PPP: 1.59 (ref 0.91–1.09)
IRON 24H UR-MRATE: 24 MCG/DL (ref 42–180)
IRON SATN MFR SERPL: 8 % (ref 20–45)
LDLC/HDLC SERPL: 1.54 {RATIO}
LDLC/HDLC SERPL: 2.54 {RATIO}
LEFT ATRIUM VOLUME INDEX: 56 ML/M2
LV EF 2D ECHO EST: 65 %
LYMPHOCYTES # BLD AUTO: 1.02 10*3/MM3 (ref 0.72–4.86)
LYMPHOCYTES # BLD AUTO: 1.22 10*3/MM3 (ref 0.72–4.86)
LYMPHOCYTES # BLD AUTO: 1.42 10*3/MM3 (ref 0.72–4.86)
LYMPHOCYTES # BLD AUTO: 1.54 10*3/MM3 (ref 0.72–4.86)
LYMPHOCYTES # BLD AUTO: 1.64 10*3/MM3 (ref 0.72–4.86)
LYMPHOCYTES # BLD AUTO: 2.11 10*3/MM3 (ref 0.72–4.86)
LYMPHOCYTES # BLD MANUAL: 1.92 10*3/MM3 (ref 0.72–4.86)
LYMPHOCYTES NFR BLD AUTO: 14.1 % (ref 15–45)
LYMPHOCYTES NFR BLD AUTO: 14.1 % (ref 15–45)
LYMPHOCYTES NFR BLD AUTO: 14.4 % (ref 15–45)
LYMPHOCYTES NFR BLD AUTO: 17.8 % (ref 15–45)
LYMPHOCYTES NFR BLD AUTO: 19.7 % (ref 15–45)
LYMPHOCYTES NFR BLD AUTO: 5.7 % (ref 15–45)
LYMPHOCYTES NFR BLD MANUAL: 16.2 % (ref 15–45)
LYMPHOCYTES NFR BLD MANUAL: 7.1 % (ref 4–12)
MAGNESIUM SERPL-MCNC: 1.8 MG/DL (ref 1.4–2.2)
MAGNESIUM SERPL-MCNC: 1.8 MG/DL (ref 1.4–2.2)
MAGNESIUM SERPL-MCNC: 2 MG/DL (ref 1.4–2.2)
MAGNESIUM SERPL-MCNC: 2 MG/DL (ref 1.4–2.2)
MAXIMAL PREDICTED HEART RATE: 136 BPM
MCH RBC QN AUTO: 28.9 PG (ref 28–32)
MCH RBC QN AUTO: 29 PG (ref 28–32)
MCH RBC QN AUTO: 29.4 PG (ref 28–32)
MCH RBC QN AUTO: 29.4 PG (ref 28–32)
MCH RBC QN AUTO: 29.6 PG (ref 28–32)
MCH RBC QN AUTO: 29.8 PG (ref 28–32)
MCHC RBC AUTO-ENTMCNC: 32.7 G/DL (ref 33–36)
MCHC RBC AUTO-ENTMCNC: 32.9 G/DL (ref 33–36)
MCHC RBC AUTO-ENTMCNC: 33 G/DL (ref 33–36)
MCHC RBC AUTO-ENTMCNC: 33.1 G/DL (ref 33–36)
MCHC RBC AUTO-ENTMCNC: 33.2 G/DL (ref 33–36)
MCHC RBC AUTO-ENTMCNC: 33.7 G/DL (ref 33–36)
MCHC RBC AUTO-ENTMCNC: 33.8 G/DL (ref 33–36)
MCHC RBC AUTO-ENTMCNC: 34.6 G/DL (ref 33–36)
MCV RBC AUTO: 86.1 FL (ref 82–95)
MCV RBC AUTO: 87.4 FL (ref 82–95)
MCV RBC AUTO: 87.4 FL (ref 82–95)
MCV RBC AUTO: 87.6 FL (ref 82–95)
MCV RBC AUTO: 87.8 FL (ref 82–95)
MCV RBC AUTO: 89.1 FL (ref 82–95)
MCV RBC AUTO: 89.6 FL (ref 82–95)
MCV RBC AUTO: 90.5 FL (ref 82–95)
MONOCYTES # BLD AUTO: 0.76 10*3/MM3 (ref 0.19–1.3)
MONOCYTES # BLD AUTO: 0.84 10*3/MM3 (ref 0.19–1.3)
MONOCYTES # BLD AUTO: 0.9 10*3/MM3 (ref 0.19–1.3)
MONOCYTES # BLD AUTO: 0.94 10*3/MM3 (ref 0.19–1.3)
MONOCYTES # BLD AUTO: 0.94 10*3/MM3 (ref 0.19–1.3)
MONOCYTES # BLD AUTO: 1.12 10*3/MM3 (ref 0.19–1.3)
MONOCYTES # BLD AUTO: 1.23 10*3/MM3 (ref 0.19–1.3)
MONOCYTES NFR BLD AUTO: 10.4 % (ref 4–12)
MONOCYTES NFR BLD AUTO: 10.4 % (ref 4–12)
MONOCYTES NFR BLD AUTO: 10.9 % (ref 4–12)
MONOCYTES NFR BLD AUTO: 6.8 % (ref 4–12)
MONOCYTES NFR BLD AUTO: 7.5 % (ref 4–12)
MONOCYTES NFR BLD AUTO: 8.3 % (ref 4–12)
NEUTROPHILS # BLD AUTO: 15.6 10*3/MM3 (ref 1.87–8.4)
NEUTROPHILS # BLD AUTO: 6.08 10*3/MM3 (ref 1.87–8.4)
NEUTROPHILS # BLD AUTO: 6.45 10*3/MM3 (ref 1.87–8.4)
NEUTROPHILS # BLD AUTO: 7.38 10*3/MM3 (ref 1.87–8.4)
NEUTROPHILS # BLD AUTO: 7.54 10*3/MM3 (ref 1.87–8.4)
NEUTROPHILS # BLD AUTO: 8.62 10*3/MM3 (ref 1.87–8.4)
NEUTROPHILS # BLD AUTO: 9.1 10*3/MM3 (ref 1.87–8.4)
NEUTROPHILS NFR BLD AUTO: 68.9 % (ref 39–78)
NEUTROPHILS NFR BLD AUTO: 70.2 % (ref 39–78)
NEUTROPHILS NFR BLD AUTO: 74.6 % (ref 39–78)
NEUTROPHILS NFR BLD AUTO: 74.6 % (ref 39–78)
NEUTROPHILS NFR BLD AUTO: 75.6 % (ref 39–78)
NEUTROPHILS NFR BLD AUTO: 86.5 % (ref 39–78)
NEUTROPHILS NFR BLD MANUAL: 75.8 % (ref 39–78)
NEUTS BAND NFR BLD MANUAL: 1 % (ref 0–10)
NRBC BLD MANUAL-RTO: 0 /100 WBC (ref 0–0)
PHOSPHATE SERPL-MCNC: 4.1 MG/DL (ref 2.5–4.5)
PLAT MORPH BLD: NORMAL
PLATELET # BLD AUTO: 216 10*3/MM3 (ref 130–400)
PLATELET # BLD AUTO: 279 10*3/MM3 (ref 130–400)
PLATELET # BLD AUTO: 287 10*3/MM3 (ref 130–400)
PLATELET # BLD AUTO: 302 10*3/MM3 (ref 130–400)
PLATELET # BLD AUTO: 302 10*3/MM3 (ref 130–400)
PLATELET # BLD AUTO: 334 10*3/MM3 (ref 130–400)
PLATELET # BLD AUTO: 340 10*3/MM3 (ref 130–400)
PLATELET # BLD AUTO: 350 10*3/MM3 (ref 130–400)
PMV BLD AUTO: 10 FL (ref 6–12)
PMV BLD AUTO: 10.1 FL (ref 6–12)
PMV BLD AUTO: 10.1 FL (ref 6–12)
PMV BLD AUTO: 10.3 FL (ref 6–12)
PMV BLD AUTO: 10.5 FL (ref 6–12)
PMV BLD AUTO: 10.6 FL (ref 6–12)
POTASSIUM BLD-SCNC: 3.4 MMOL/L (ref 3.5–5.3)
POTASSIUM BLD-SCNC: 3.7 MMOL/L (ref 3.5–5.3)
POTASSIUM BLD-SCNC: 3.8 MMOL/L (ref 3.5–5.3)
POTASSIUM BLD-SCNC: 3.9 MMOL/L (ref 3.5–5.3)
POTASSIUM BLD-SCNC: 4 MMOL/L (ref 3.5–5.3)
POTASSIUM BLD-SCNC: 4.1 MMOL/L (ref 3.5–5.3)
POTASSIUM BLD-SCNC: 4.5 MMOL/L (ref 3.5–5.3)
POTASSIUM BLD-SCNC: 4.6 MMOL/L (ref 3.5–5.3)
POTASSIUM BLD-SCNC: 4.6 MMOL/L (ref 3.5–5.3)
PROT SERPL-MCNC: 5.9 G/DL (ref 6.3–8.7)
PROT SERPL-MCNC: 6.4 G/DL (ref 6.3–8.7)
PROT SERPL-MCNC: 6.4 G/DL (ref 6.3–8.7)
PROT SERPL-MCNC: 7.2 G/DL (ref 6.3–8.7)
PROT SERPL-MCNC: 7.6 G/DL (ref 6.3–8.7)
PROTHROMBIN TIME: 15.8 SECONDS (ref 11.9–14.6)
PROTHROMBIN TIME: 16.6 SECONDS (ref 11.9–14.6)
PROTHROMBIN TIME: 17.3 SECONDS (ref 11.9–14.6)
PROTHROMBIN TIME: 18.9 SECONDS (ref 11.9–14.6)
PROTHROMBIN TIME: 19.5 SECONDS (ref 11.9–14.6)
RBC # BLD AUTO: 3.07 10*6/MM3 (ref 4.8–5.9)
RBC # BLD AUTO: 3.26 10*6/MM3 (ref 4.8–5.9)
RBC # BLD AUTO: 3.42 10*6/MM3 (ref 4.8–5.9)
RBC # BLD AUTO: 3.45 10*6/MM3 (ref 4.8–5.9)
RBC # BLD AUTO: 3.46 10*6/MM3 (ref 4.8–5.9)
RBC # BLD AUTO: 3.48 10*6/MM3 (ref 4.8–5.9)
RBC # BLD AUTO: 3.58 10*6/MM3 (ref 4.8–5.9)
RBC # BLD AUTO: 3.95 10*6/MM3 (ref 4.8–5.9)
RBC MORPH BLD: NORMAL
RH BLD: NEGATIVE
SODIUM BLD-SCNC: 133 MMOL/L (ref 135–145)
SODIUM BLD-SCNC: 133 MMOL/L (ref 135–145)
SODIUM BLD-SCNC: 134 MMOL/L (ref 135–145)
SODIUM BLD-SCNC: 136 MMOL/L (ref 135–145)
SODIUM BLD-SCNC: 137 MMOL/L (ref 135–145)
SODIUM BLD-SCNC: 139 MMOL/L (ref 135–145)
SODIUM BLD-SCNC: 139 MMOL/L (ref 135–145)
SODIUM BLD-SCNC: 140 MMOL/L (ref 135–145)
SODIUM BLD-SCNC: 141 MMOL/L (ref 135–145)
STRESS TARGET HR: 116 BPM
T&S EXPIRATION DATE: NORMAL
TIBC SERPL-MCNC: 287 MCG/DL (ref 225–420)
TRIGL SERPL-MCNC: 33 MG/DL (ref 0–149)
TRIGL SERPL-MCNC: 72 MG/DL (ref 0–149)
TROPONIN I SERPL-MCNC: 0.03 NG/ML (ref 0–0.03)
VIT B12 BLD-MCNC: 609 PG/ML (ref 239–931)
WBC MORPH BLD: NORMAL
WBC NRBC COR # BLD: 10.1 10*3/MM3 (ref 4.8–10.8)
WBC NRBC COR # BLD: 10.72 10*3/MM3 (ref 4.8–10.8)
WBC NRBC COR # BLD: 11.39 10*3/MM3 (ref 4.8–10.8)
WBC NRBC COR # BLD: 11.85 10*3/MM3 (ref 4.8–10.8)
WBC NRBC COR # BLD: 18.03 10*3/MM3 (ref 4.8–10.8)
WBC NRBC COR # BLD: 6.6 10*3/MM3 (ref 4.8–10.8)
WBC NRBC COR # BLD: 8.65 10*3/MM3 (ref 4.8–10.8)
WBC NRBC COR # BLD: 8.66 10*3/MM3 (ref 4.8–10.8)
WHOLE BLOOD HOLD SPECIMEN: NORMAL
WHOLE BLOOD HOLD SPECIMEN: NORMAL

## 2018-01-01 PROCEDURE — 25010000002 PIPERACILLIN SOD-TAZOBACTAM PER 1 G: Performed by: FAMILY MEDICINE

## 2018-01-01 PROCEDURE — 99233 SBSQ HOSP IP/OBS HIGH 50: CPT | Performed by: PSYCHIATRY & NEUROLOGY

## 2018-01-01 PROCEDURE — 81001 URINALYSIS AUTO W/SCOPE: CPT | Performed by: FAMILY MEDICINE

## 2018-01-01 PROCEDURE — 25010000002 IRON SUCROSE PER 1 MG: Performed by: NURSE PRACTITIONER

## 2018-01-01 PROCEDURE — 80053 COMPREHEN METABOLIC PANEL: CPT | Performed by: EMERGENCY MEDICINE

## 2018-01-01 PROCEDURE — 80061 LIPID PANEL: CPT | Performed by: INTERNAL MEDICINE

## 2018-01-01 PROCEDURE — 25010000002 LORAZEPAM PER 2 MG: Performed by: INTERNAL MEDICINE

## 2018-01-01 PROCEDURE — 70450 CT HEAD/BRAIN W/O DYE: CPT

## 2018-01-01 PROCEDURE — 80053 COMPREHEN METABOLIC PANEL: CPT | Performed by: NURSE PRACTITIONER

## 2018-01-01 PROCEDURE — 85025 COMPLETE CBC W/AUTO DIFF WBC: CPT | Performed by: EMERGENCY MEDICINE

## 2018-01-01 PROCEDURE — 94760 N-INVAS EAR/PLS OXIMETRY 1: CPT

## 2018-01-01 PROCEDURE — 86900 BLOOD TYPING SEROLOGIC ABO: CPT | Performed by: EMERGENCY MEDICINE

## 2018-01-01 PROCEDURE — 93010 ELECTROCARDIOGRAM REPORT: CPT | Performed by: INTERNAL MEDICINE

## 2018-01-01 PROCEDURE — 85045 AUTOMATED RETICULOCYTE COUNT: CPT | Performed by: FAMILY MEDICINE

## 2018-01-01 PROCEDURE — 83735 ASSAY OF MAGNESIUM: CPT | Performed by: NURSE PRACTITIONER

## 2018-01-01 PROCEDURE — 93005 ELECTROCARDIOGRAM TRACING: CPT | Performed by: EMERGENCY MEDICINE

## 2018-01-01 PROCEDURE — 85610 PROTHROMBIN TIME: CPT | Performed by: INTERNAL MEDICINE

## 2018-01-01 PROCEDURE — 81003 URINALYSIS AUTO W/O SCOPE: CPT | Performed by: EMERGENCY MEDICINE

## 2018-01-01 PROCEDURE — 36415 COLL VENOUS BLD VENIPUNCTURE: CPT | Performed by: NURSE PRACTITIONER

## 2018-01-01 PROCEDURE — 99232 SBSQ HOSP IP/OBS MODERATE 35: CPT | Performed by: OTOLARYNGOLOGY

## 2018-01-01 PROCEDURE — 99285 EMERGENCY DEPT VISIT HI MDM: CPT

## 2018-01-01 PROCEDURE — G8997 SWALLOW GOAL STATUS: HCPCS | Performed by: SPEECH-LANGUAGE PATHOLOGIST

## 2018-01-01 PROCEDURE — 80048 BASIC METABOLIC PNL TOTAL CA: CPT | Performed by: INTERNAL MEDICINE

## 2018-01-01 PROCEDURE — 85651 RBC SED RATE NONAUTOMATED: CPT | Performed by: INTERNAL MEDICINE

## 2018-01-01 PROCEDURE — 87205 SMEAR GRAM STAIN: CPT | Performed by: EMERGENCY MEDICINE

## 2018-01-01 PROCEDURE — 82962 GLUCOSE BLOOD TEST: CPT

## 2018-01-01 PROCEDURE — G8988 SELF CARE GOAL STATUS: HCPCS

## 2018-01-01 PROCEDURE — 92611 MOTION FLUOROSCOPY/SWALLOW: CPT

## 2018-01-01 PROCEDURE — 83550 IRON BINDING TEST: CPT | Performed by: FAMILY MEDICINE

## 2018-01-01 PROCEDURE — 83735 ASSAY OF MAGNESIUM: CPT | Performed by: FAMILY MEDICINE

## 2018-01-01 PROCEDURE — 93306 TTE W/DOPPLER COMPLETE: CPT

## 2018-01-01 PROCEDURE — 82728 ASSAY OF FERRITIN: CPT | Performed by: INTERNAL MEDICINE

## 2018-01-01 PROCEDURE — 25010000002 PIPERACILLIN SOD-TAZOBACTAM PER 1 G: Performed by: EMERGENCY MEDICINE

## 2018-01-01 PROCEDURE — 25010000002 FUROSEMIDE PER 20 MG: Performed by: FAMILY MEDICINE

## 2018-01-01 PROCEDURE — 87040 BLOOD CULTURE FOR BACTERIA: CPT | Performed by: EMERGENCY MEDICINE

## 2018-01-01 PROCEDURE — 99231 SBSQ HOSP IP/OBS SF/LOW 25: CPT | Performed by: INTERNAL MEDICINE

## 2018-01-01 PROCEDURE — 0DH63UZ INSERTION OF FEEDING DEVICE INTO STOMACH, PERCUTANEOUS APPROACH: ICD-10-PCS | Performed by: INTERNAL MEDICINE

## 2018-01-01 PROCEDURE — 83036 HEMOGLOBIN GLYCOSYLATED A1C: CPT | Performed by: INTERNAL MEDICINE

## 2018-01-01 PROCEDURE — 80053 COMPREHEN METABOLIC PANEL: CPT | Performed by: FAMILY MEDICINE

## 2018-01-01 PROCEDURE — 85730 THROMBOPLASTIN TIME PARTIAL: CPT | Performed by: EMERGENCY MEDICINE

## 2018-01-01 PROCEDURE — 74230 X-RAY XM SWLNG FUNCJ C+: CPT

## 2018-01-01 PROCEDURE — 82746 ASSAY OF FOLIC ACID SERUM: CPT | Performed by: FAMILY MEDICINE

## 2018-01-01 PROCEDURE — 71045 X-RAY EXAM CHEST 1 VIEW: CPT

## 2018-01-01 PROCEDURE — 83880 ASSAY OF NATRIURETIC PEPTIDE: CPT | Performed by: FAMILY MEDICINE

## 2018-01-01 PROCEDURE — 25010000002 LEVOFLOXACIN PER 250 MG: Performed by: EMERGENCY MEDICINE

## 2018-01-01 PROCEDURE — 99232 SBSQ HOSP IP/OBS MODERATE 35: CPT | Performed by: NURSE PRACTITIONER

## 2018-01-01 PROCEDURE — 85610 PROTHROMBIN TIME: CPT | Performed by: EMERGENCY MEDICINE

## 2018-01-01 PROCEDURE — 85027 COMPLETE CBC AUTOMATED: CPT | Performed by: INTERNAL MEDICINE

## 2018-01-01 PROCEDURE — 70496 CT ANGIOGRAPHY HEAD: CPT

## 2018-01-01 PROCEDURE — 80061 LIPID PANEL: CPT | Performed by: NURSE PRACTITIONER

## 2018-01-01 PROCEDURE — 99214 OFFICE O/P EST MOD 30 MIN: CPT | Performed by: PHYSICIAN ASSISTANT

## 2018-01-01 PROCEDURE — 85025 COMPLETE CBC W/AUTO DIFF WBC: CPT | Performed by: NURSE PRACTITIONER

## 2018-01-01 PROCEDURE — 99222 1ST HOSP IP/OBS MODERATE 55: CPT | Performed by: INTERNAL MEDICINE

## 2018-01-01 PROCEDURE — 94799 UNLISTED PULMONARY SVC/PX: CPT

## 2018-01-01 PROCEDURE — 25810000003 SODIUM CHLORIDE 0.9 % WITH KCL 20 MEQ 20-0.9 MEQ/L-% SOLUTION: Performed by: NURSE PRACTITIONER

## 2018-01-01 PROCEDURE — 85610 PROTHROMBIN TIME: CPT | Performed by: NURSE PRACTITIONER

## 2018-01-01 PROCEDURE — 85025 COMPLETE CBC W/AUTO DIFF WBC: CPT | Performed by: FAMILY MEDICINE

## 2018-01-01 PROCEDURE — 85027 COMPLETE CBC AUTOMATED: CPT | Performed by: NURSE PRACTITIONER

## 2018-01-01 PROCEDURE — 83930 ASSAY OF BLOOD OSMOLALITY: CPT | Performed by: FAMILY MEDICINE

## 2018-01-01 PROCEDURE — 85007 BL SMEAR W/DIFF WBC COUNT: CPT | Performed by: NURSE PRACTITIONER

## 2018-01-01 PROCEDURE — 83605 ASSAY OF LACTIC ACID: CPT | Performed by: EMERGENCY MEDICINE

## 2018-01-01 PROCEDURE — 70498 CT ANGIOGRAPHY NECK: CPT

## 2018-01-01 PROCEDURE — C1769 GUIDE WIRE: HCPCS | Performed by: INTERNAL MEDICINE

## 2018-01-01 PROCEDURE — 83880 ASSAY OF NATRIURETIC PEPTIDE: CPT | Performed by: EMERGENCY MEDICINE

## 2018-01-01 PROCEDURE — 43246 EGD PLACE GASTROSTOMY TUBE: CPT | Performed by: INTERNAL MEDICINE

## 2018-01-01 PROCEDURE — 87150 DNA/RNA AMPLIFIED PROBE: CPT | Performed by: EMERGENCY MEDICINE

## 2018-01-01 PROCEDURE — 94660 CPAP INITIATION&MGMT: CPT

## 2018-01-01 PROCEDURE — 83735 ASSAY OF MAGNESIUM: CPT | Performed by: INTERNAL MEDICINE

## 2018-01-01 PROCEDURE — 85025 COMPLETE CBC W/AUTO DIFF WBC: CPT | Performed by: INTERNAL MEDICINE

## 2018-01-01 PROCEDURE — 25010000002 VITAMIN K1 PER 1 MG: Performed by: INTERNAL MEDICINE

## 2018-01-01 PROCEDURE — 0 IOPAMIDOL PER 1 ML: Performed by: EMERGENCY MEDICINE

## 2018-01-01 PROCEDURE — 80162 ASSAY OF DIGOXIN TOTAL: CPT | Performed by: EMERGENCY MEDICINE

## 2018-01-01 PROCEDURE — 82607 VITAMIN B-12: CPT | Performed by: INTERNAL MEDICINE

## 2018-01-01 PROCEDURE — 86901 BLOOD TYPING SEROLOGIC RH(D): CPT | Performed by: EMERGENCY MEDICINE

## 2018-01-01 PROCEDURE — 82607 VITAMIN B-12: CPT | Performed by: FAMILY MEDICINE

## 2018-01-01 PROCEDURE — 25010000003 CEFAZOLIN PER 500 MG: Performed by: INTERNAL MEDICINE

## 2018-01-01 PROCEDURE — 93005 ELECTROCARDIOGRAM TRACING: CPT

## 2018-01-01 PROCEDURE — 99222 1ST HOSP IP/OBS MODERATE 55: CPT | Performed by: PHYSICIAN ASSISTANT

## 2018-01-01 PROCEDURE — 80048 BASIC METABOLIC PNL TOTAL CA: CPT | Performed by: NURSE PRACTITIONER

## 2018-01-01 PROCEDURE — G8987 SELF CARE CURRENT STATUS: HCPCS

## 2018-01-01 PROCEDURE — 83540 ASSAY OF IRON: CPT | Performed by: FAMILY MEDICINE

## 2018-01-01 PROCEDURE — 93306 TTE W/DOPPLER COMPLETE: CPT | Performed by: INTERNAL MEDICINE

## 2018-01-01 PROCEDURE — 25010000002 MIDAZOLAM PER 1 MG: Performed by: EMERGENCY MEDICINE

## 2018-01-01 PROCEDURE — 25010000002 VANCOMYCIN PER 500 MG: Performed by: FAMILY MEDICINE

## 2018-01-01 PROCEDURE — 83550 IRON BINDING TEST: CPT | Performed by: INTERNAL MEDICINE

## 2018-01-01 PROCEDURE — 84484 ASSAY OF TROPONIN QUANT: CPT | Performed by: FAMILY MEDICINE

## 2018-01-01 PROCEDURE — 71275 CT ANGIOGRAPHY CHEST: CPT

## 2018-01-01 PROCEDURE — 84100 ASSAY OF PHOSPHORUS: CPT | Performed by: NURSE PRACTITIONER

## 2018-01-01 PROCEDURE — 74177 CT ABD & PELVIS W/CONTRAST: CPT

## 2018-01-01 PROCEDURE — 99232 SBSQ HOSP IP/OBS MODERATE 35: CPT | Performed by: PSYCHIATRY & NEUROLOGY

## 2018-01-01 PROCEDURE — G8996 SWALLOW CURRENT STATUS: HCPCS | Performed by: SPEECH-LANGUAGE PATHOLOGIST

## 2018-01-01 PROCEDURE — 25010000002 PROPOFOL 10 MG/ML EMULSION: Performed by: NURSE ANESTHETIST, CERTIFIED REGISTERED

## 2018-01-01 PROCEDURE — 92610 EVALUATE SWALLOWING FUNCTION: CPT | Performed by: SPEECH-LANGUAGE PATHOLOGIST

## 2018-01-01 PROCEDURE — 25010000002 HEPARIN (PORCINE) PER 1000 UNITS: Performed by: INTERNAL MEDICINE

## 2018-01-01 PROCEDURE — 85014 HEMATOCRIT: CPT | Performed by: FAMILY MEDICINE

## 2018-01-01 PROCEDURE — 25010000002 MORPHINE SULFATE (PF) 2 MG/ML SOLUTION: Performed by: FAMILY MEDICINE

## 2018-01-01 PROCEDURE — 25010000002 VANCOMYCIN 10 G RECONSTITUTED SOLUTION: Performed by: EMERGENCY MEDICINE

## 2018-01-01 PROCEDURE — 82803 BLOOD GASES ANY COMBINATION: CPT

## 2018-01-01 PROCEDURE — 83540 ASSAY OF IRON: CPT | Performed by: INTERNAL MEDICINE

## 2018-01-01 PROCEDURE — 84100 ASSAY OF PHOSPHORUS: CPT | Performed by: FAMILY MEDICINE

## 2018-01-01 PROCEDURE — 93005 ELECTROCARDIOGRAM TRACING: CPT | Performed by: FAMILY MEDICINE

## 2018-01-01 PROCEDURE — 93000 ELECTROCARDIOGRAM COMPLETE: CPT | Performed by: PHYSICIAN ASSISTANT

## 2018-01-01 PROCEDURE — 02HV33Z INSERTION OF INFUSION DEVICE INTO SUPERIOR VENA CAVA, PERCUTANEOUS APPROACH: ICD-10-PCS | Performed by: INTERNAL MEDICINE

## 2018-01-01 PROCEDURE — 82728 ASSAY OF FERRITIN: CPT | Performed by: FAMILY MEDICINE

## 2018-01-01 PROCEDURE — 84484 ASSAY OF TROPONIN QUANT: CPT | Performed by: EMERGENCY MEDICINE

## 2018-01-01 PROCEDURE — 25010000002 LORAZEPAM PER 2 MG: Performed by: FAMILY MEDICINE

## 2018-01-01 PROCEDURE — 85730 THROMBOPLASTIN TIME PARTIAL: CPT | Performed by: INTERNAL MEDICINE

## 2018-01-01 PROCEDURE — 36600 WITHDRAWAL OF ARTERIAL BLOOD: CPT

## 2018-01-01 PROCEDURE — 82746 ASSAY OF FOLIC ACID SERUM: CPT | Performed by: INTERNAL MEDICINE

## 2018-01-01 PROCEDURE — 97167 OT EVAL HIGH COMPLEX 60 MIN: CPT

## 2018-01-01 PROCEDURE — 84300 ASSAY OF URINE SODIUM: CPT | Performed by: FAMILY MEDICINE

## 2018-01-01 PROCEDURE — 92526 ORAL FUNCTION THERAPY: CPT

## 2018-01-01 PROCEDURE — 31237 NSL/SINS NDSC SURG BX POLYPC: CPT | Performed by: OTOLARYNGOLOGY

## 2018-01-01 PROCEDURE — 99223 1ST HOSP IP/OBS HIGH 75: CPT | Performed by: PSYCHIATRY & NEUROLOGY

## 2018-01-01 PROCEDURE — 82747 ASSAY OF FOLIC ACID RBC: CPT | Performed by: FAMILY MEDICINE

## 2018-01-01 PROCEDURE — 25010000002 MORPHINE SULFATE (PF) 2 MG/ML SOLUTION

## 2018-01-01 PROCEDURE — 83935 ASSAY OF URINE OSMOLALITY: CPT | Performed by: FAMILY MEDICINE

## 2018-01-01 PROCEDURE — 86850 RBC ANTIBODY SCREEN: CPT | Performed by: EMERGENCY MEDICINE

## 2018-01-01 DEVICE — BARD PEG SAFETY SYSTEM
Type: IMPLANTABLE DEVICE | Site: STOMACH | Status: FUNCTIONAL
Brand: BARD PEG SAFETY SYSTEM

## 2018-01-01 RX ORDER — FAMOTIDINE 20 MG/1
20 TABLET, FILM COATED ORAL DAILY
Status: DISCONTINUED | OUTPATIENT
Start: 2018-01-01 | End: 2018-01-01 | Stop reason: HOSPADM

## 2018-01-01 RX ORDER — SODIUM CHLORIDE 0.9 % (FLUSH) 0.9 %
10 SYRINGE (ML) INJECTION AS NEEDED
Status: DISCONTINUED | OUTPATIENT
Start: 2018-01-01 | End: 2018-01-01 | Stop reason: HOSPADM

## 2018-01-01 RX ORDER — SODIUM CHLORIDE 9 MG/ML
100 INJECTION, SOLUTION INTRAVENOUS CONTINUOUS
Status: DISCONTINUED | OUTPATIENT
Start: 2018-01-01 | End: 2018-01-01

## 2018-01-01 RX ORDER — DILTIAZEM HYDROCHLORIDE 120 MG/1
120 CAPSULE, COATED, EXTENDED RELEASE ORAL DAILY
Status: DISCONTINUED | OUTPATIENT
Start: 2018-01-01 | End: 2018-01-01

## 2018-01-01 RX ORDER — SCOLOPAMINE TRANSDERMAL SYSTEM 1 MG/1
1 PATCH, EXTENDED RELEASE TRANSDERMAL
Status: DISCONTINUED | OUTPATIENT
Start: 2018-01-01 | End: 2018-01-01 | Stop reason: HOSPADM

## 2018-01-01 RX ORDER — ECHINACEA PURPUREA EXTRACT 125 MG
2 TABLET ORAL
Status: DISCONTINUED | OUTPATIENT
Start: 2018-01-01 | End: 2018-01-01 | Stop reason: HOSPADM

## 2018-01-01 RX ORDER — OXYMETAZOLINE HYDROCHLORIDE 0.05 G/100ML
2 SPRAY NASAL 2 TIMES DAILY PRN
Status: DISCONTINUED | OUTPATIENT
Start: 2018-01-01 | End: 2018-01-01 | Stop reason: HOSPADM

## 2018-01-01 RX ORDER — FAMOTIDINE 20 MG/1
20 TABLET, FILM COATED ORAL DAILY
Start: 2018-01-01

## 2018-01-01 RX ORDER — FLUTICASONE PROPIONATE 50 MCG
2 SPRAY, SUSPENSION (ML) NASAL DAILY
Status: DISCONTINUED | OUTPATIENT
Start: 2018-01-01 | End: 2018-01-01

## 2018-01-01 RX ORDER — DIGOXIN 0.05 MG/ML
125 SOLUTION ORAL
Status: DISCONTINUED | OUTPATIENT
Start: 2018-01-01 | End: 2018-01-01 | Stop reason: HOSPADM

## 2018-01-01 RX ORDER — DILTIAZEM HYDROCHLORIDE 5 MG/ML
0.25 INJECTION INTRAVENOUS ONCE
Status: COMPLETED | OUTPATIENT
Start: 2018-01-01 | End: 2018-01-01

## 2018-01-01 RX ORDER — SODIUM CHLORIDE 0.9 % (FLUSH) 0.9 %
1-10 SYRINGE (ML) INJECTION AS NEEDED
Status: CANCELLED | OUTPATIENT
Start: 2018-01-01

## 2018-01-01 RX ORDER — ASPIRIN 81 MG/1
81 TABLET, CHEWABLE ORAL DAILY
Status: DISCONTINUED | OUTPATIENT
Start: 2018-01-01 | End: 2018-01-01

## 2018-01-01 RX ORDER — MIDAZOLAM HYDROCHLORIDE 1 MG/ML
2 INJECTION INTRAMUSCULAR; INTRAVENOUS ONCE
Status: COMPLETED | OUTPATIENT
Start: 2018-01-01 | End: 2018-01-01

## 2018-01-01 RX ORDER — OXYMETAZOLINE HYDROCHLORIDE 0.05 G/100ML
2 SPRAY NASAL 2 TIMES DAILY PRN
Status: ON HOLD
Start: 2018-01-01 | End: 2018-01-01

## 2018-01-01 RX ORDER — MORPHINE SULFATE 2 MG/ML
INJECTION, SOLUTION INTRAMUSCULAR; INTRAVENOUS
Status: COMPLETED
Start: 2018-01-01 | End: 2018-01-01

## 2018-01-01 RX ORDER — SODIUM CHLORIDE 0.9 % (FLUSH) 0.9 %
1-10 SYRINGE (ML) INJECTION AS NEEDED
Status: DISCONTINUED | OUTPATIENT
Start: 2018-01-01 | End: 2018-01-01 | Stop reason: HOSPADM

## 2018-01-01 RX ORDER — AMANTADINE HYDROCHLORIDE 100 MG/1
100 TABLET ORAL EVERY 12 HOURS SCHEDULED
Status: DISCONTINUED | OUTPATIENT
Start: 2018-01-01 | End: 2018-01-01

## 2018-01-01 RX ORDER — LORAZEPAM 2 MG/ML
0.5 INJECTION INTRAMUSCULAR EVERY 4 HOURS PRN
Status: DISCONTINUED | OUTPATIENT
Start: 2018-01-01 | End: 2018-01-01

## 2018-01-01 RX ORDER — ECHINACEA PURPUREA EXTRACT 125 MG
2 TABLET ORAL
Status: ON HOLD | COMMUNITY
Start: 2018-01-01 | End: 2018-01-01

## 2018-01-01 RX ORDER — ATORVASTATIN CALCIUM 40 MG/1
40 TABLET, FILM COATED ORAL NIGHTLY
Status: DISCONTINUED | OUTPATIENT
Start: 2018-01-01 | End: 2018-01-01

## 2018-01-01 RX ORDER — LEVOFLOXACIN 5 MG/ML
750 INJECTION, SOLUTION INTRAVENOUS ONCE
Status: COMPLETED | OUTPATIENT
Start: 2018-01-01 | End: 2018-01-01

## 2018-01-01 RX ORDER — LORAZEPAM 2 MG/ML
1 INJECTION INTRAMUSCULAR EVERY 6 HOURS PRN
Status: DISCONTINUED | OUTPATIENT
Start: 2018-01-01 | End: 2018-01-01 | Stop reason: HOSPADM

## 2018-01-01 RX ORDER — ATORVASTATIN CALCIUM 40 MG/1
80 TABLET, FILM COATED ORAL NIGHTLY
Status: DISCONTINUED | OUTPATIENT
Start: 2018-01-01 | End: 2018-01-01 | Stop reason: HOSPADM

## 2018-01-01 RX ORDER — ASPIRIN 81 MG/1
81 TABLET, CHEWABLE ORAL DAILY
Status: DISCONTINUED | OUTPATIENT
Start: 2018-01-01 | End: 2018-01-01 | Stop reason: HOSPADM

## 2018-01-01 RX ORDER — FLUTICASONE PROPIONATE 50 MCG
2 SPRAY, SUSPENSION (ML) NASAL DAILY
COMMUNITY

## 2018-01-01 RX ORDER — LORAZEPAM 0.5 MG/1
0.5 TABLET ORAL EVERY 6 HOURS PRN
Status: DISCONTINUED | OUTPATIENT
Start: 2018-01-01 | End: 2018-01-01 | Stop reason: HOSPADM

## 2018-01-01 RX ORDER — OXYCODONE HYDROCHLORIDE 5 MG/1
5 TABLET ORAL EVERY 4 HOURS PRN
Status: ON HOLD | COMMUNITY
Start: 2018-01-01 | End: 2018-01-01

## 2018-01-01 RX ORDER — MORPHINE SULFATE 2 MG/ML
1 INJECTION, SOLUTION INTRAMUSCULAR; INTRAVENOUS
Status: DISCONTINUED | OUTPATIENT
Start: 2018-01-01 | End: 2018-01-01 | Stop reason: HOSPADM

## 2018-01-01 RX ORDER — ACETAMINOPHEN 160 MG/5ML
885 SOLUTION ORAL ONCE
Status: COMPLETED | OUTPATIENT
Start: 2018-01-01 | End: 2018-01-01

## 2018-01-01 RX ORDER — ACETAMINOPHEN 650 MG/1
650 SUPPOSITORY RECTAL EVERY 4 HOURS PRN
Status: DISCONTINUED | OUTPATIENT
Start: 2018-01-01 | End: 2018-01-01

## 2018-01-01 RX ORDER — MECLIZINE HYDROCHLORIDE 25 MG/1
25 TABLET ORAL 3 TIMES DAILY PRN
Status: ON HOLD | COMMUNITY
End: 2018-01-01

## 2018-01-01 RX ORDER — LEVOFLOXACIN 5 MG/ML
500 INJECTION, SOLUTION INTRAVENOUS EVERY 24 HOURS
Status: DISCONTINUED | OUTPATIENT
Start: 2018-01-01 | End: 2018-01-01

## 2018-01-01 RX ORDER — NICOTINE POLACRILEX 4 MG
15 LOZENGE BUCCAL
Status: DISCONTINUED | OUTPATIENT
Start: 2018-01-01 | End: 2018-01-01 | Stop reason: HOSPADM

## 2018-01-01 RX ORDER — ECHINACEA PURPUREA EXTRACT 125 MG
2 TABLET ORAL
Status: DISCONTINUED | OUTPATIENT
Start: 2018-01-01 | End: 2018-01-01

## 2018-01-01 RX ORDER — FUROSEMIDE 10 MG/ML
40 INJECTION INTRAMUSCULAR; INTRAVENOUS EVERY 12 HOURS
Status: DISCONTINUED | OUTPATIENT
Start: 2018-01-01 | End: 2018-01-01

## 2018-01-01 RX ORDER — ATORVASTATIN CALCIUM 40 MG/1
80 TABLET, FILM COATED ORAL NIGHTLY
Status: DISCONTINUED | OUTPATIENT
Start: 2018-01-01 | End: 2018-01-01

## 2018-01-01 RX ORDER — SODIUM CHLORIDE 9 MG/ML
100 INJECTION, SOLUTION INTRAVENOUS CONTINUOUS
Status: CANCELLED | OUTPATIENT
Start: 2018-01-01

## 2018-01-01 RX ORDER — LORAZEPAM 0.5 MG/1
0.5 TABLET ORAL EVERY 6 HOURS PRN
Qty: 12 TABLET | Refills: 0 | Status: SHIPPED | OUTPATIENT
Start: 2018-01-01

## 2018-01-01 RX ORDER — MIDAZOLAM HYDROCHLORIDE 1 MG/ML
2 INJECTION INTRAMUSCULAR; INTRAVENOUS ONCE
Status: DISCONTINUED | OUTPATIENT
Start: 2018-01-01 | End: 2018-01-01

## 2018-01-01 RX ORDER — ONDANSETRON 2 MG/ML
4 INJECTION INTRAMUSCULAR; INTRAVENOUS EVERY 6 HOURS PRN
Status: DISCONTINUED | OUTPATIENT
Start: 2018-01-01 | End: 2018-01-01 | Stop reason: HOSPADM

## 2018-01-01 RX ORDER — PHYTONADIONE 10 MG/ML
10 INJECTION, EMULSION INTRAMUSCULAR; INTRAVENOUS; SUBCUTANEOUS ONCE
Status: DISCONTINUED | OUTPATIENT
Start: 2018-01-01 | End: 2018-01-01

## 2018-01-01 RX ORDER — ACETAMINOPHEN 160 MG/5ML
650 SOLUTION ORAL EVERY 6 HOURS PRN
Status: DISCONTINUED | OUTPATIENT
Start: 2018-01-01 | End: 2018-01-01 | Stop reason: HOSPADM

## 2018-01-01 RX ORDER — ATORVASTATIN CALCIUM 80 MG/1
40 TABLET, FILM COATED ORAL NIGHTLY
Status: ON HOLD | COMMUNITY
End: 2018-01-01 | Stop reason: ALTCHOICE

## 2018-01-01 RX ORDER — DIGOXIN 0.05 MG/ML
125 SOLUTION ORAL
Start: 2018-01-01

## 2018-01-01 RX ORDER — SODIUM CHLORIDE 0.9 % (FLUSH) 0.9 %
10 SYRINGE (ML) INJECTION AS NEEDED
Status: DISCONTINUED | OUTPATIENT
Start: 2018-01-01 | End: 2018-01-01

## 2018-01-01 RX ORDER — MORPHINE SULFATE 2 MG/ML
1 INJECTION, SOLUTION INTRAMUSCULAR; INTRAVENOUS EVERY 4 HOURS PRN
Status: DISCONTINUED | OUTPATIENT
Start: 2018-01-01 | End: 2018-01-01

## 2018-01-01 RX ORDER — ENALAPRILAT 2.5 MG/2ML
0.62 INJECTION INTRAVENOUS EVERY 6 HOURS PRN
Status: DISCONTINUED | OUTPATIENT
Start: 2018-01-01 | End: 2018-01-01 | Stop reason: HOSPADM

## 2018-01-01 RX ORDER — PROPOFOL 10 MG/ML
VIAL (ML) INTRAVENOUS AS NEEDED
Status: DISCONTINUED | OUTPATIENT
Start: 2018-01-01 | End: 2018-01-01 | Stop reason: SURG

## 2018-01-01 RX ORDER — ASPIRIN 81 MG/1
81 TABLET, CHEWABLE ORAL DAILY
Start: 2018-01-01

## 2018-01-01 RX ORDER — SODIUM CHLORIDE 9 MG/ML
INJECTION, SOLUTION INTRAVENOUS CONTINUOUS PRN
Status: DISCONTINUED | OUTPATIENT
Start: 2018-01-01 | End: 2018-01-01 | Stop reason: SURG

## 2018-01-01 RX ORDER — ACETAMINOPHEN 325 MG/1
650 TABLET ORAL EVERY 4 HOURS PRN
Status: DISCONTINUED | OUTPATIENT
Start: 2018-01-01 | End: 2018-01-01

## 2018-01-01 RX ORDER — SODIUM CHLORIDE AND POTASSIUM CHLORIDE 150; 900 MG/100ML; MG/100ML
50 INJECTION, SOLUTION INTRAVENOUS CONTINUOUS
Status: DISCONTINUED | OUTPATIENT
Start: 2018-01-01 | End: 2018-01-01

## 2018-01-01 RX ORDER — FAMOTIDINE 20 MG/1
20 TABLET, FILM COATED ORAL DAILY
Status: DISCONTINUED | OUTPATIENT
Start: 2018-01-01 | End: 2018-01-01

## 2018-01-01 RX ORDER — POLYVINYL ALCOHOL 14 MG/ML
2 SOLUTION/ DROPS OPHTHALMIC 2 TIMES DAILY PRN
COMMUNITY

## 2018-01-01 RX ORDER — ECHINACEA PURPUREA EXTRACT 125 MG
2 TABLET ORAL 4 TIMES DAILY
Status: DISCONTINUED | OUTPATIENT
Start: 2018-01-01 | End: 2018-01-01 | Stop reason: SDUPTHER

## 2018-01-01 RX ORDER — LIDOCAINE HYDROCHLORIDE 20 MG/ML
INJECTION, SOLUTION INFILTRATION; PERINEURAL AS NEEDED
Status: DISCONTINUED | OUTPATIENT
Start: 2018-01-01 | End: 2018-01-01 | Stop reason: SURG

## 2018-01-01 RX ORDER — AMANTADINE HYDROCHLORIDE 100 MG/1
100 TABLET ORAL EVERY 12 HOURS SCHEDULED
Start: 2018-01-01

## 2018-01-01 RX ORDER — DIGOXIN 0.05 MG/ML
125 SOLUTION ORAL
Status: DISCONTINUED | OUTPATIENT
Start: 2018-01-01 | End: 2018-01-01

## 2018-01-01 RX ORDER — AMANTADINE HYDROCHLORIDE 100 MG/1
100 TABLET ORAL EVERY 12 HOURS SCHEDULED
Status: DISCONTINUED | OUTPATIENT
Start: 2018-01-01 | End: 2018-01-01 | Stop reason: HOSPADM

## 2018-01-01 RX ORDER — ASPIRIN 300 MG/1
300 SUPPOSITORY RECTAL NIGHTLY
Status: DISCONTINUED | OUTPATIENT
Start: 2018-01-01 | End: 2018-01-01

## 2018-01-01 RX ORDER — SCOLOPAMINE TRANSDERMAL SYSTEM 1 MG/1
1 PATCH, EXTENDED RELEASE TRANSDERMAL
Status: DISCONTINUED | OUTPATIENT
Start: 2018-01-01 | End: 2018-01-01

## 2018-01-01 RX ORDER — POLYVINYL ALCOHOL 14 MG/ML
2 SOLUTION/ DROPS OPHTHALMIC AS NEEDED
Status: DISCONTINUED | OUTPATIENT
Start: 2018-01-01 | End: 2018-01-01

## 2018-01-01 RX ORDER — DEXTROSE MONOHYDRATE 25 G/50ML
25 INJECTION, SOLUTION INTRAVENOUS
Status: DISCONTINUED | OUTPATIENT
Start: 2018-01-01 | End: 2018-01-01 | Stop reason: HOSPADM

## 2018-01-01 RX ORDER — OXYMETAZOLINE HYDROCHLORIDE 0.05 G/100ML
2 SPRAY NASAL 2 TIMES DAILY PRN
Status: DISCONTINUED | OUTPATIENT
Start: 2018-01-01 | End: 2018-01-01

## 2018-01-01 RX ORDER — FAMOTIDINE 10 MG/ML
20 INJECTION, SOLUTION INTRAVENOUS EVERY 12 HOURS SCHEDULED
Status: DISCONTINUED | OUTPATIENT
Start: 2018-01-01 | End: 2018-01-01

## 2018-01-01 RX ORDER — ASPIRIN 81 MG/1
324 TABLET, CHEWABLE ORAL DAILY
Status: DISCONTINUED | OUTPATIENT
Start: 2018-01-01 | End: 2018-01-01

## 2018-01-01 RX ORDER — ECHINACEA PURPUREA EXTRACT 125 MG
2 TABLET ORAL
Refills: 12
Start: 2018-01-01

## 2018-01-01 RX ORDER — VANCOMYCIN HYDROCHLORIDE 1 G/200ML
1000 INJECTION, SOLUTION INTRAVENOUS EVERY 24 HOURS
Status: DISCONTINUED | OUTPATIENT
Start: 2018-01-01 | End: 2018-01-01

## 2018-01-01 RX ADMIN — MUPIROCIN: 20 OINTMENT TOPICAL at 09:32

## 2018-01-01 RX ADMIN — DIGOXIN 125 MCG: 0.05 SOLUTION ORAL at 11:53

## 2018-01-01 RX ADMIN — Medication 2 SPRAY: at 20:38

## 2018-01-01 RX ADMIN — DILTIAZEM HYDROCHLORIDE 30 MG: 30 TABLET, FILM COATED ORAL at 23:49

## 2018-01-01 RX ADMIN — RIVAROXABAN 15 MG: 15 TABLET, FILM COATED ORAL at 08:25

## 2018-01-01 RX ADMIN — IRON SUCROSE 200 MG: 20 INJECTION, SOLUTION INTRAVENOUS at 16:55

## 2018-01-01 RX ADMIN — POTASSIUM CHLORIDE AND SODIUM CHLORIDE 50 ML/HR: 900; 150 INJECTION, SOLUTION INTRAVENOUS at 11:48

## 2018-01-01 RX ADMIN — Medication 2 SPRAY: at 23:02

## 2018-01-01 RX ADMIN — FAMOTIDINE 20 MG: 20 TABLET, FILM COATED ORAL at 08:45

## 2018-01-01 RX ADMIN — DILTIAZEM HYDROCHLORIDE 18.1 MG: 5 INJECTION INTRAVENOUS at 20:30

## 2018-01-01 RX ADMIN — POTASSIUM CHLORIDE AND SODIUM CHLORIDE 50 ML/HR: 900; 150 INJECTION, SOLUTION INTRAVENOUS at 10:21

## 2018-01-01 RX ADMIN — AMANTADINE HYDROCHLORIDE 100 MG: 100 TABLET ORAL at 08:19

## 2018-01-01 RX ADMIN — MUPIROCIN: 20 OINTMENT TOPICAL at 10:06

## 2018-01-01 RX ADMIN — PROPOFOL 250 MG: 10 INJECTION, EMULSION INTRAVENOUS at 10:28

## 2018-01-01 RX ADMIN — FAMOTIDINE 20 MG: 10 INJECTION INTRAVENOUS at 09:15

## 2018-01-01 RX ADMIN — Medication 2 SPRAY: at 21:21

## 2018-01-01 RX ADMIN — AMANTADINE HYDROCHLORIDE 100 MG: 100 TABLET ORAL at 08:45

## 2018-01-01 RX ADMIN — Medication 2 SPRAY: at 06:02

## 2018-01-01 RX ADMIN — MORPHINE SULFATE 1 MG: 2 INJECTION, SOLUTION INTRAMUSCULAR; INTRAVENOUS at 22:27

## 2018-01-01 RX ADMIN — BARIUM SULFATE 250 ML: 400 SUSPENSION ORAL at 11:30

## 2018-01-01 RX ADMIN — IRON SUCROSE 200 MG: 20 INJECTION, SOLUTION INTRAVENOUS at 15:49

## 2018-01-01 RX ADMIN — VANCOMYCIN HYDROCHLORIDE 1000 MG: 1 INJECTION, SOLUTION INTRAVENOUS at 03:50

## 2018-01-01 RX ADMIN — TAZOBACTAM SODIUM AND PIPERACILLIN SODIUM 4.5 G: 500; 4 INJECTION, SOLUTION INTRAVENOUS at 08:25

## 2018-01-01 RX ADMIN — FAMOTIDINE 20 MG: 10 INJECTION INTRAVENOUS at 20:48

## 2018-01-01 RX ADMIN — ASPIRIN 81 MG: 81 TABLET, CHEWABLE ORAL at 08:45

## 2018-01-01 RX ADMIN — FAMOTIDINE 20 MG: 10 INJECTION INTRAVENOUS at 09:09

## 2018-01-01 RX ADMIN — BARIUM SULFATE 55 ML: 0.81 POWDER, FOR SUSPENSION ORAL at 11:30

## 2018-01-01 RX ADMIN — Medication 2 SPRAY: at 21:35

## 2018-01-01 RX ADMIN — IRON SUCROSE 200 MG: 20 INJECTION, SOLUTION INTRAVENOUS at 16:46

## 2018-01-01 RX ADMIN — Medication 2 SPRAY: at 18:00

## 2018-01-01 RX ADMIN — AMANTADINE HYDROCHLORIDE 100 MG: 100 TABLET ORAL at 08:25

## 2018-01-01 RX ADMIN — ACETAMINOPHEN 650 MG: 650 SOLUTION ORAL at 23:27

## 2018-01-01 RX ADMIN — Medication 2 SPRAY: at 07:34

## 2018-01-01 RX ADMIN — DILTIAZEM HYDROCHLORIDE 30 MG: 30 TABLET, FILM COATED ORAL at 06:46

## 2018-01-01 RX ADMIN — DILTIAZEM HYDROCHLORIDE 5 MG/HR: 5 INJECTION INTRAVENOUS at 20:31

## 2018-01-01 RX ADMIN — DILTIAZEM HYDROCHLORIDE 30 MG: 30 TABLET, FILM COATED ORAL at 17:06

## 2018-01-01 RX ADMIN — Medication 2 SPRAY: at 15:22

## 2018-01-01 RX ADMIN — MORPHINE SULFATE 1 MG: 2 INJECTION, SOLUTION INTRAMUSCULAR; INTRAVENOUS at 18:12

## 2018-01-01 RX ADMIN — Medication 2 SPRAY: at 17:27

## 2018-01-01 RX ADMIN — DILTIAZEM HYDROCHLORIDE 30 MG: 30 TABLET, FILM COATED ORAL at 23:51

## 2018-01-01 RX ADMIN — LEVOFLOXACIN 750 MG: 5 INJECTION, SOLUTION INTRAVENOUS at 05:04

## 2018-01-01 RX ADMIN — Medication 2 SPRAY: at 21:32

## 2018-01-01 RX ADMIN — Medication 2 SPRAY: at 21:06

## 2018-01-01 RX ADMIN — LORAZEPAM 0.5 MG: 2 INJECTION INTRAMUSCULAR; INTRAVENOUS at 00:19

## 2018-01-01 RX ADMIN — FAMOTIDINE 20 MG: 20 TABLET, FILM COATED ORAL at 08:19

## 2018-01-01 RX ADMIN — Medication 2 SPRAY: at 11:53

## 2018-01-01 RX ADMIN — DILTIAZEM HYDROCHLORIDE 30 MG: 30 TABLET, FILM COATED ORAL at 12:19

## 2018-01-01 RX ADMIN — BARIUM SULFATE 10 ML: 400 PASTE ORAL at 11:30

## 2018-01-01 RX ADMIN — Medication 2 SPRAY: at 10:49

## 2018-01-01 RX ADMIN — CEFAZOLIN SODIUM 1 G: 1 INJECTION, POWDER, FOR SOLUTION INTRAMUSCULAR; INTRAVENOUS at 18:37

## 2018-01-01 RX ADMIN — BUMETANIDE 0.5 MG/HR: 0.25 INJECTION INTRAMUSCULAR; INTRAVENOUS at 08:40

## 2018-01-01 RX ADMIN — TAZOBACTAM SODIUM AND PIPERACILLIN SODIUM 4.5 G: 500; 4 INJECTION, SOLUTION INTRAVENOUS at 23:49

## 2018-01-01 RX ADMIN — LIDOCAINE HYDROCHLORIDE 100 MG: 20 INJECTION, SOLUTION INFILTRATION; PERINEURAL at 10:28

## 2018-01-01 RX ADMIN — DILTIAZEM HYDROCHLORIDE 30 MG: 30 TABLET, FILM COATED ORAL at 17:26

## 2018-01-01 RX ADMIN — SODIUM CHLORIDE 100 ML/HR: 9 INJECTION, SOLUTION INTRAVENOUS at 05:51

## 2018-01-01 RX ADMIN — IOPAMIDOL 100 ML: 755 INJECTION, SOLUTION INTRAVENOUS at 05:36

## 2018-01-01 RX ADMIN — Medication 2 SPRAY: at 06:46

## 2018-01-01 RX ADMIN — PIPERACILLIN SODIUM AND TAZOBACTAM SODIUM 3.38 G: 3; .375 INJECTION, POWDER, LYOPHILIZED, FOR SOLUTION INTRAVENOUS at 02:38

## 2018-01-01 RX ADMIN — MORPHINE SULFATE 1 MG: 2 INJECTION, SOLUTION INTRAMUSCULAR; INTRAVENOUS at 23:40

## 2018-01-01 RX ADMIN — ASPIRIN 81 MG: 81 TABLET, CHEWABLE ORAL at 09:31

## 2018-01-01 RX ADMIN — FAMOTIDINE 20 MG: 10 INJECTION INTRAVENOUS at 10:06

## 2018-01-01 RX ADMIN — Medication 2 SPRAY: at 00:34

## 2018-01-01 RX ADMIN — Medication 2 SPRAY: at 09:23

## 2018-01-01 RX ADMIN — IOPAMIDOL 100 ML: 755 INJECTION, SOLUTION INTRAVENOUS at 20:15

## 2018-01-01 RX ADMIN — DILTIAZEM HYDROCHLORIDE 30 MG: 30 TABLET, FILM COATED ORAL at 23:11

## 2018-01-01 RX ADMIN — Medication 2 SPRAY: at 21:24

## 2018-01-01 RX ADMIN — DILTIAZEM HYDROCHLORIDE 30 MG: 30 TABLET, FILM COATED ORAL at 11:42

## 2018-01-01 RX ADMIN — FLUTICASONE PROPIONATE 2 SPRAY: 50 SPRAY, METERED NASAL at 08:20

## 2018-01-01 RX ADMIN — FAMOTIDINE 20 MG: 20 TABLET, FILM COATED ORAL at 09:32

## 2018-01-01 RX ADMIN — ASPIRIN 300 MG: 300 SUPPOSITORY RECTAL at 21:32

## 2018-01-01 RX ADMIN — AMANTADINE HYDROCHLORIDE 100 MG: 100 TABLET ORAL at 10:06

## 2018-01-01 RX ADMIN — SODIUM CHLORIDE 100 ML/HR: 9 INJECTION, SOLUTION INTRAVENOUS at 20:23

## 2018-01-01 RX ADMIN — LORAZEPAM 0.5 MG: 2 INJECTION INTRAMUSCULAR; INTRAVENOUS at 04:56

## 2018-01-01 RX ADMIN — Medication 2 SPRAY: at 06:52

## 2018-01-01 RX ADMIN — Medication 2 SPRAY: at 13:55

## 2018-01-01 RX ADMIN — Medication 2 SPRAY: at 21:41

## 2018-01-01 RX ADMIN — ASPIRIN 81 MG 81 MG: 81 TABLET ORAL at 08:25

## 2018-01-01 RX ADMIN — ASPIRIN 81 MG 324 MG: 81 TABLET ORAL at 10:06

## 2018-01-01 RX ADMIN — Medication 2 SPRAY: at 14:15

## 2018-01-01 RX ADMIN — Medication 2 SPRAY: at 17:21

## 2018-01-01 RX ADMIN — DIGOXIN 125 MCG: 0.05 SOLUTION ORAL at 12:19

## 2018-01-01 RX ADMIN — CEFAZOLIN 1000 MG: 1 INJECTION, POWDER, FOR SOLUTION INTRAVENOUS at 10:18

## 2018-01-01 RX ADMIN — FAMOTIDINE 20 MG: 20 TABLET, FILM COATED ORAL at 08:25

## 2018-01-01 RX ADMIN — LORAZEPAM 0.5 MG: 2 INJECTION INTRAMUSCULAR; INTRAVENOUS at 04:35

## 2018-01-01 RX ADMIN — SODIUM CHLORIDE 100 ML/HR: 9 INJECTION, SOLUTION INTRAVENOUS at 00:34

## 2018-01-01 RX ADMIN — Medication 2 SPRAY: at 06:19

## 2018-01-01 RX ADMIN — Medication 2 SPRAY: at 15:50

## 2018-01-01 RX ADMIN — HEPARIN SODIUM 12 UNITS/KG/HR: 10000 INJECTION, SOLUTION INTRAVENOUS at 01:38

## 2018-01-01 RX ADMIN — SODIUM CHLORIDE: 9 INJECTION, SOLUTION INTRAVENOUS at 10:11

## 2018-01-01 RX ADMIN — DESMOPRESSIN ACETATE 80 MG: 0.2 TABLET ORAL at 23:02

## 2018-01-01 RX ADMIN — AMANTADINE HYDROCHLORIDE 100 MG: 100 TABLET ORAL at 20:37

## 2018-01-01 RX ADMIN — MUPIROCIN: 20 OINTMENT TOPICAL at 21:17

## 2018-01-01 RX ADMIN — FUROSEMIDE 40 MG: 10 INJECTION, SOLUTION INTRAMUSCULAR; INTRAVENOUS at 08:19

## 2018-01-01 RX ADMIN — LORAZEPAM 1 MG: 2 INJECTION INTRAMUSCULAR at 23:40

## 2018-01-01 RX ADMIN — PHYTONADIONE 10 MG: 10 INJECTION, EMULSION INTRAMUSCULAR; INTRAVENOUS; SUBCUTANEOUS at 18:40

## 2018-01-01 RX ADMIN — Medication 2 SPRAY: at 09:31

## 2018-01-01 RX ADMIN — Medication 2 SPRAY: at 11:49

## 2018-01-01 RX ADMIN — FUROSEMIDE 40 MG: 10 INJECTION, SOLUTION INTRAMUSCULAR; INTRAVENOUS at 20:37

## 2018-01-01 RX ADMIN — FAMOTIDINE 20 MG: 10 INJECTION INTRAVENOUS at 09:23

## 2018-01-01 RX ADMIN — ASPIRIN 300 MG: 300 SUPPOSITORY RECTAL at 20:35

## 2018-01-01 RX ADMIN — Medication 2 SPRAY: at 16:32

## 2018-01-01 RX ADMIN — SODIUM CHLORIDE 100 ML/HR: 9 INJECTION, SOLUTION INTRAVENOUS at 07:33

## 2018-01-01 RX ADMIN — Medication 2 SPRAY: at 16:15

## 2018-01-01 RX ADMIN — Medication 2 SPRAY: at 17:28

## 2018-01-01 RX ADMIN — DESMOPRESSIN ACETATE 80 MG: 0.2 TABLET ORAL at 20:48

## 2018-01-01 RX ADMIN — FAMOTIDINE 20 MG: 10 INJECTION INTRAVENOUS at 20:35

## 2018-01-01 RX ADMIN — FAMOTIDINE 20 MG: 10 INJECTION INTRAVENOUS at 21:32

## 2018-01-01 RX ADMIN — DILTIAZEM HYDROCHLORIDE 30 MG: 30 TABLET, FILM COATED ORAL at 17:23

## 2018-01-01 RX ADMIN — DIGOXIN 125 MCG: 0.05 SOLUTION ORAL at 12:36

## 2018-01-01 RX ADMIN — Medication 2 SPRAY: at 07:13

## 2018-01-01 RX ADMIN — FAMOTIDINE 20 MG: 10 INJECTION INTRAVENOUS at 21:17

## 2018-01-01 RX ADMIN — MUPIROCIN: 20 OINTMENT TOPICAL at 22:01

## 2018-01-01 RX ADMIN — ASPIRIN 81 MG 81 MG: 81 TABLET ORAL at 08:24

## 2018-01-01 RX ADMIN — TAZOBACTAM SODIUM AND PIPERACILLIN SODIUM 4.5 G: 500; 4 INJECTION, SOLUTION INTRAVENOUS at 17:06

## 2018-01-01 RX ADMIN — LORAZEPAM 0.5 MG: 2 INJECTION INTRAMUSCULAR; INTRAVENOUS at 23:02

## 2018-01-01 RX ADMIN — FAMOTIDINE 20 MG: 10 INJECTION INTRAVENOUS at 01:43

## 2018-01-01 RX ADMIN — Medication 2 SPRAY: at 18:01

## 2018-01-01 RX ADMIN — AMANTADINE HYDROCHLORIDE 100 MG: 100 TABLET ORAL at 21:35

## 2018-01-01 RX ADMIN — Medication 2 SPRAY: at 18:37

## 2018-01-01 RX ADMIN — DIGOXIN 125 MCG: 0.05 SOLUTION ORAL at 12:44

## 2018-01-01 RX ADMIN — DILTIAZEM HYDROCHLORIDE 30 MG: 30 TABLET, FILM COATED ORAL at 06:31

## 2018-01-01 RX ADMIN — DILTIAZEM HYDROCHLORIDE 30 MG: 30 TABLET, FILM COATED ORAL at 12:36

## 2018-01-01 RX ADMIN — DILTIAZEM HYDROCHLORIDE 5 MG/HR: 5 INJECTION INTRAVENOUS at 01:59

## 2018-01-01 RX ADMIN — VANCOMYCIN HYDROCHLORIDE 1250 MG: 10 INJECTION, POWDER, LYOPHILIZED, FOR SOLUTION INTRAVENOUS at 03:18

## 2018-01-01 RX ADMIN — MUPIROCIN: 20 OINTMENT TOPICAL at 21:35

## 2018-01-01 RX ADMIN — AMANTADINE HYDROCHLORIDE 100 MG: 100 TABLET ORAL at 09:32

## 2018-01-01 RX ADMIN — Medication 2 SPRAY: at 12:08

## 2018-01-01 RX ADMIN — Medication 2 SPRAY: at 10:06

## 2018-01-01 RX ADMIN — ASPIRIN 300 MG: 300 SUPPOSITORY RECTAL at 22:01

## 2018-01-01 RX ADMIN — AMANTADINE HYDROCHLORIDE 100 MG: 100 TABLET ORAL at 23:03

## 2018-01-01 RX ADMIN — TAZOBACTAM SODIUM AND PIPERACILLIN SODIUM 4.5 G: 500; 4 INJECTION, SOLUTION INTRAVENOUS at 08:40

## 2018-01-01 RX ADMIN — AMANTADINE HYDROCHLORIDE 100 MG: 100 TABLET ORAL at 20:27

## 2018-01-01 RX ADMIN — DILTIAZEM HYDROCHLORIDE 30 MG: 30 TABLET, FILM COATED ORAL at 11:53

## 2018-01-01 RX ADMIN — FLUTICASONE PROPIONATE 2 SPRAY: 50 SPRAY, METERED NASAL at 08:25

## 2018-01-01 RX ADMIN — Medication 2 SPRAY: at 09:09

## 2018-01-01 RX ADMIN — DILTIAZEM HYDROCHLORIDE 5 MG/HR: 5 INJECTION INTRAVENOUS at 22:49

## 2018-01-01 RX ADMIN — MIDAZOLAM HYDROCHLORIDE 2 MG: 1 INJECTION, SOLUTION INTRAMUSCULAR; INTRAVENOUS at 20:27

## 2018-01-01 RX ADMIN — DEXTROSE MONOHYDRATE 25 G: 25 INJECTION, SOLUTION INTRAVENOUS at 06:51

## 2018-01-01 RX ADMIN — DILTIAZEM HYDROCHLORIDE 5 MG/HR: 5 INJECTION INTRAVENOUS at 00:31

## 2018-01-01 RX ADMIN — DILTIAZEM HYDROCHLORIDE 30 MG: 30 TABLET, FILM COATED ORAL at 08:19

## 2018-01-01 RX ADMIN — DESMOPRESSIN ACETATE 80 MG: 0.2 TABLET ORAL at 21:35

## 2018-01-01 RX ADMIN — ACETAMINOPHEN 885 MG: 325 SOLUTION ORAL at 03:25

## 2018-01-01 RX ADMIN — RIVAROXABAN 15 MG: 15 TABLET, FILM COATED ORAL at 08:41

## 2018-01-01 RX ADMIN — POTASSIUM CHLORIDE AND SODIUM CHLORIDE 50 ML/HR: 900; 150 INJECTION, SOLUTION INTRAVENOUS at 11:53

## 2018-01-01 RX ADMIN — DILTIAZEM HYDROCHLORIDE 30 MG: 30 TABLET, FILM COATED ORAL at 05:08

## 2018-01-01 RX ADMIN — DILTIAZEM HYDROCHLORIDE 30 MG: 30 TABLET, FILM COATED ORAL at 17:59

## 2018-01-01 RX ADMIN — DILTIAZEM HYDROCHLORIDE 30 MG: 30 TABLET, FILM COATED ORAL at 12:44

## 2018-01-01 RX ADMIN — MUPIROCIN: 20 OINTMENT TOPICAL at 13:25

## 2018-01-01 RX ADMIN — TAZOBACTAM SODIUM AND PIPERACILLIN SODIUM 4.5 G: 500; 4 INJECTION, SOLUTION INTRAVENOUS at 16:03

## 2018-01-01 RX ADMIN — MUPIROCIN: 20 OINTMENT TOPICAL at 11:53

## 2018-01-01 RX ADMIN — AMANTADINE HYDROCHLORIDE 100 MG: 100 TABLET ORAL at 20:48

## 2018-01-01 RX ADMIN — Medication 2 SPRAY: at 17:52

## 2018-05-01 NOTE — PROGRESS NOTES
Subjective:     Encounter Date:05/01/2018      Patient ID: Alejandro Gerber is a 84 y.o. male w hx of AF, AS, CAD, HLD, carotid artery disease who [facility]-up. The patient notes he's been doing well since his last visit. He does note falling 3 days ago. He tells me he did not lose consciousness or have preceding symptoms such as dizziness. He simply tripped on a step and fell. He tells me that he has not had any other instances of falling within the last 1+ year. He relates that he did not hit his head or have any bleeding with this isolated incident. Otherwise, he denies any excessive dyspnea, chest pain or similar.     Chief Complaint:  Atrial Fibrillation   Presents for follow-up visit. Symptoms include dizziness (chronic, unchanged). Symptoms are negative for chest pain, palpitations, shortness of breath and syncope. The symptoms have been stable. Past medical history includes atrial fibrillation, CAD and hyperlipidemia. There are no medication compliance problems.   Coronary Artery Disease   Presents for follow-up visit. Symptoms include dizziness (chronic, unchanged). Pertinent negatives include no chest pain, leg swelling, palpitations, shortness of breath or weight gain. Risk factors include hyperlipidemia. Risk factors do not include obesity. The symptoms have been stable. Compliance with diet is good. Compliance with exercise is poor. Compliance with medications is good.   Hyperlipidemia   This is a chronic problem. The current episode started more than 1 year ago. He has no history of obesity. There are no known factors aggravating his hyperlipidemia. Pertinent negatives include no chest pain, focal weakness, myalgias or shortness of breath. Current antihyperlipidemic treatment includes statins. The current treatment provides moderate improvement of lipids. Compliance problems include adherence to exercise.  Risk factors for coronary artery disease include male sex  and dyslipidemia.       The following portions of the patient's history were reviewed and updated as appropriate: allergies, current medications, past family history, past medical history, past social history, past surgical history and problem list.    Review of Systems   Constitution: Negative for malaise/fatigue and weight gain.   Cardiovascular: Positive for dyspnea on exertion (chronic, mild, stable). Negative for chest pain, claudication, irregular heartbeat, leg swelling, near-syncope, orthopnea, palpitations, paroxysmal nocturnal dyspnea and syncope.   Respiratory: Negative for hemoptysis and shortness of breath.    Hematologic/Lymphatic: Negative for bleeding problem.   Skin: Negative for poor wound healing.   Musculoskeletal: Positive for falls. Negative for myalgias.   Gastrointestinal: Negative for melena, nausea and vomiting.   Genitourinary: Negative for hematuria.   Neurological: Positive for dizziness (chronic, unchanged). Negative for focal weakness and light-headedness.   Psychiatric/Behavioral: Negative for depression and memory loss. The patient is not nervous/anxious.    All other systems reviewed and are negative.        ECG 12 Lead  Date/Time: 5/1/2018 3:17 PM  Performed by: ERENDIRA MUELLER  Authorized by: ERENDIRA MUELLER   Comparison: compared with previous ECG from 4/17/2017  Rhythm: atrial fibrillation  Rate: normal  Conduction: LAFB  QRS axis: left  Clinical impression: abnormal ECG               Objective:     Physical Exam   Constitutional: He is oriented to person, place, and time. He appears well-developed and well-nourished.   HENT:   Head: Normocephalic and atraumatic.   Eyes: Conjunctivae and EOM are normal. Pupils are equal, round, and reactive to light.   Neck: Normal range of motion. Neck supple. No JVD present.   Cardiovascular: Normal rate, regular rhythm, S1 normal, S2 normal, intact distal pulses and normal pulses.    Murmur heard.   Systolic murmur is present with a grade of  1/6  at the upper right sternal border  Pulmonary/Chest: Effort normal and breath sounds normal. No respiratory distress.   Abdominal: Soft. Bowel sounds are normal. He exhibits no distension.   Musculoskeletal: He exhibits no edema or tenderness.   Neurological: He is alert and oriented to person, place, and time.   Skin: Skin is warm and dry.   Psychiatric: He has a normal mood and affect. Judgment normal.   Vitals reviewed.      There were no vitals taken for this visit.    Current Outpatient Prescriptions:   •  aspirin 81 MG EC tablet, Take 1 tablet by mouth Daily., Disp: , Rfl:   •  atorvastatin (LIPITOR) 80 MG tablet, Take 1 tablet by mouth Daily., Disp: 30 tablet, Rfl: 0  •  diltiaZEM CD (CARDIZEM CD) 120 MG 24 hr capsule, Take 120 mg by mouth Daily., Disp: , Rfl:   •  pantoprazole (PROTONIX) 40 MG EC tablet, Take 1 tablet by mouth Daily., Disp: 30 tablet, Rfl: 0  •  rivaroxaban (XARELTO) 20 MG tablet, Take 20 mg by mouth Daily., Disp: , Rfl:   Past Medical History:   Diagnosis Date   • Allergic rhinitis    • Asthma    • Atrial fibrillation    • Cancer     PROSTATE   • Chronic anticoagulation    • Chronic sinusitis    • Coronary artery disease    • Eustachian tube dysfunction    • Hard of hearing    • Hyperlipidemia    • Hypertrophy of inferior nasal turbinate    • Irregular heart rate    • Prostate cancer    • Shingles     2 years ago   • Stroke      Past Surgical History:   Procedure Laterality Date   • CARDIAC SURGERY     • CARDIAC SURGERY      cath & cabg   • COCHLEAR IMPLANT REVISION      insertion   • COCHLEAR IMPLANT REVISION     • CORONARY ARTERY BYPASS GRAFT     • PROSTATE SURGERY     • PROSTATECTOMY       Allergies   Allergen Reactions   • Metoprolol Rash     Social History     Social History   • Marital status:      Spouse name: N/A   • Number of children: N/A   • Years of education: N/A     Occupational History   • Not on file.     Social History Main Topics   • Smoking status: Never Smoker    • Smokeless tobacco: Never Used   • Alcohol use No   • Drug use: No   • Sexual activity: Defer     Other Topics Concern   • Not on file     Social History Narrative    ** Merged History Encounter **         ** Merged History Encounter **          Family History   Problem Relation Age of Onset   • No Known Problems Mother    • Alzheimer's disease Father            Assessment:          Diagnosis Plan   1. Atrial fibrillation, unspecified type      Anticoagulated on Xarelto   2. Nonrheumatic aortic valve stenosis     3. Coronary artery disease involving native coronary artery of native heart without angina pectoris     4. Mixed hyperlipidemia      On statin, followed by PCP   5. Hemispheric carotid artery syndrome            Plan:       1. Obtain lipid panel. May be able to reduce statin dose- his son notes he has not been taking this and is unsure why. Potentially he had some myalgias with the 80 mg dose. We will likely restart at lower dose, pending this result.  2. Continue present therapy otherwise. He is to call if he has recurrence of fall. We may have to consider taking him off of Xarelto if this is the case. Continue for now as he denies any other falls in the recent past.   3. Follow-up in 1 year, unless needed sooner  4. Verbalized understanding of instructions.

## 2018-06-25 PROBLEM — I63.9 CEREBROVASCULAR ACCIDENT (CVA) (HCC): Status: ACTIVE | Noted: 2018-01-01

## 2018-06-29 PROBLEM — R62.7 FAILURE TO THRIVE IN ADULT: Status: ACTIVE | Noted: 2018-01-01

## 2018-06-29 NOTE — ANESTHESIA POSTPROCEDURE EVALUATION
"Patient: Alejandro Gerber    Procedure Summary     Date:  06/29/18 Room / Location:  [facility] ENDOSCOPY 4 / [facility] END[facility]    Anesthesia Start:  1020 Anesthesia Stop:  1046    Procedure:  ESOPHAGOGASTRODUODENOSCOPY WITH GASTROSTOMY TUBE INSERTION (N/A ) Diagnosis:       Failure to thrive in adult      (Failure to thrive in adult [R62.7])    Surgeon:  Lakeisha Carrero MD Provider:  Gege Wilkinson CRNA    Anesthesia Type:  general ASA Status:  4          Anesthesia Type: general  Last vitals  BP   104/58 (06/29/18 1050)   Temp   97.8 °F (36.6 °C) (06/29/18 0740)   Pulse   89 (06/29/18 1050)   Resp   23 (06/29/18 1050)     SpO2   99 % (06/29/18 1050)     Post Anesthesia Care and Evaluation    Patient location during evaluation: bedside  Patient participation: complete - patient participated  Level of consciousness: awake and awake and alert  Pain score: 0  Pain management: adequate  Airway patency: patent  Anesthetic complications: No anesthetic complications  PONV Status: none  Cardiovascular status: acceptable  Respiratory status: acceptable  Hydration status: acceptable    Comments: Patient discharged according to acceptable Cyndy score per RN assessment. See nursing records for further information.     Blood pressure 104/58, pulse 89, temperature 97.8 °F (36.6 °C), temperature source Axillary, resp. rate 23, height 165.1 cm (65\"), weight 59 kg (130 lb 1.6 oz), SpO2 99 %.        "

## 2018-06-29 NOTE — ANESTHESIA PREPROCEDURE EVALUATION
Anesthesia Evaluation                  Airway   No difficulty expected  Dental          Pulmonary    (+) asthma,   Cardiovascular     PT is on anticoagulation therapy    (+) valvular problems/murmurs AS, CAD, CABG, dysrhythmias Atrial Fib, hyperlipidemia,     ROS comment: Off xarelto 6/18    Echo:  ·Left ventricular systolic function is normal. Estimated EF = 65%.  ·Right ventricular cavity is borderline dilated.  ·Left atrial cavity size is moderately dilated.  ·There is moderate calcification of the aortic valve.  ·Mild aortic valve regurgitation is present.  ·Moderate to severe aortic valve stenosis is present.  ·Mild mitral valve regurgitation is present  ·Mild tricuspid valve regurgitation is present.    Neuro/Psych  (+) TIA, CVA, poor historian (patient severely hard of hearing, no family at bedside).,     GI/Hepatic/Renal/Endo      Musculoskeletal     Abdominal    Substance History      OB/GYN          Other      history of cancer    ROS/Med Hx Other: Cochlear implants                  Anesthesia Plan    ASA 4     general   total IV anesthesia(Patient on diltiazem drip. Does not answer questions. History as per chart review)  intravenous induction   Anesthetic plan and risks discussed with patient.

## 2018-07-01 NOTE — PLAN OF CARE
Problem: Patient Care Overview  Goal: Plan of Care Review  Outcome: Ongoing (interventions implemented as appropriate)   07/01/18 4228   Coping/Psychosocial   Plan of Care Reviewed With patient;son   Plan of Care Review   Progress no change   OTHER   Outcome Summary Pt has been very drowsy throughout shift, but now is talking some. Pt is alert and oriented to self and place. Pt denies pain. Turned Q2H, VSS. Jevity 1.2 @ goal rate of 60ml/hr, meds crushed and given through peg. Safety maintained. Family at bedside. Continue to monitor.        Problem: Fall Risk (Adult)  Goal: Absence of Fall  Outcome: Ongoing (interventions implemented as appropriate)      Problem: Stroke (Ischemic) (Adult)  Goal: Signs and Symptoms of Listed Potential Problems Will be Absent, Minimized or Managed (Stroke)  Outcome: Ongoing (interventions implemented as appropriate)      Problem: Skin Injury Risk (Adult)  Goal: Skin Health and Integrity  Outcome: Ongoing (interventions implemented as appropriate)      Problem: Nutrition, Enteral (Adult)  Goal: Signs and Symptoms of Listed Potential Problems Will be Absent, Minimized or Managed (Nutrition, Enteral)  Outcome: Ongoing (interventions implemented as appropriate)

## 2018-07-01 NOTE — PROGRESS NOTES
Neurology Progress Note      Chief Complaint:  stroke    Subjective     Subjective:    He is restless at night.  His wife is currently at the bedside.  He was asleep yesterday until 3 PM and woke up it would have brief conversations.  This morning he is again sleepy and difficult to arouse.  With sternal rub or noxious stimulation he does open his eyes withdrawal.    Medications:  Current Facility-Administered Medications   Medication Dose Route Frequency Provider Last Rate Last Dose   • acetaminophen (TYLENOL) 160 MG/5ML solution 650 mg  650 mg Oral Q6H PRN Petros Triplett DO   650 mg at 06/30/18 2327   • amantadine (SYMMETREL) tablet 100 mg  100 mg Oral Q12H Joe Carreno MD   100 mg at 07/01/18 0932   • aspirin chewable tablet 81 mg  81 mg Per PEG Tube Daily Petros Triplett DO   81 mg at 07/01/18 0931   • atorvastatin (LIPITOR) tablet 80 mg  80 mg Per G Tube Nightly Gary Jenkins APRN   80 mg at 06/30/18 2302   • dextrose (D50W) solution 25 g  25 g Intravenous Q15 Min PRN Branden Leslie MD   25 g at 06/30/18 0651   • dextrose (GLUTOSE) oral gel 15 g  15 g Oral Q15 Min PRN Branden Leslie MD       • diltiaZEM (CARDIZEM) tablet 30 mg  30 mg Per G Tube Q6H Gary Jenkins APRN   30 mg at 07/01/18 0508   • enalaprilat (VASOTEC) injection 0.625 mg  0.625 mg Intravenous Q6H PRN Branden Leslie MD       • famotidine (PEPCID) tablet 20 mg  20 mg Per G Tube Daily Petros Triplett DO   20 mg at 07/01/18 0932   • glucagon (human recombinant) (GLUCAGEN DIAGNOSTIC) injection 1 mg  1 mg Subcutaneous Q15 Min PRN Branden Leslie MD       • LORazepam (ATIVAN) injection 0.5 mg  0.5 mg Intravenous Q4H PRN Branden Leslie MD   0.5 mg at 06/30/18 2302   • mupirocin (BACTROBAN) 2 % nasal ointment   Each Nare BID Eliezer Perez MD       • ondansetron (ZOFRAN) injection 4 mg  4 mg Intravenous Q6H PRN Branden Leslie MD       • oxymetazoline (AFRIN) nasal spray 2 spray  2 spray Each Nare BID PRN Gavi Franks,  APRN       • sodium chloride (OCEAN) nasal spray 2 spray  2 spray Each Nare 5x Daily Branden Leslie MD   2 spray at 07/01/18 0931   • sodium chloride 0.9 % flush 1-10 mL  1-10 mL Intravenous PRN Branden Leslie MD       • sodium chloride 0.9 % flush 10 mL  10 mL Intravenous PRN Tyler Yao MD           Review of Systems:   -A 14 point review of systems is completed and is negative except for Left-sided weakness      Objective      Vital Signs  Temp:  [97.5 °F (36.4 °C)-98.2 °F (36.8 °C)] 97.5 °F (36.4 °C)  Heart Rate:  [] 96  Resp:  [16-20] 16  BP: (128-135)/(62-81) 131/69    Physical Exam:    General Exam:  Head:  Normal cephalic, atraumatic  HEENT:  Neck supple.  Bandages below nose from recent surgery  Fundoscopic Exam:  No signs of disc edema  CVS:  Irregular rate and rhythm  Carotid Examination:  No bruits  Lungs:  Clear to auscultation  Abdomen:  PEG in place  Extremities:  No signs of peripheral edema  Skin:  No rashes     Neurologic Exam:     Mental Status:    -Extremely somnolent.  He does awaken and can tell me his name.  He can identify family members as well.  He follows commands and are minimally.     CN II:  Pupils equal  CN III, IV, VI:  No signs of nystagmus  CN V:    CN VII:  Symmetric facial grimace  CN VIII:  Gross hearing loss bilaterally  CN IX:  Palate elevates symmetrically  CN X:  Palate elevates symmetrically  CN XI:    CN XII:      Motor:     He has spontaneous movement of his right arm and leg.  He does withdraw from painful stimulation on left arm and leg with 3 out of 5 strength     DTR:  Upgoing toes bilaterally     Sensory:  -Withdraws from noxious stim relation on all 4 extremities     Coordination:  -No ataxia noted no tremors noted.     Gait  -Nonambulatory     Results Review:    I reviewed the patient's new clinical results.      Results from last 7 days  Lab Units 06/27/18  0611 06/26/18  0611 06/25/18  2347   WBC 10*3/mm3 6.60 8.66 8.65   HEMOGLOBIN g/dL  10.2* 9.9* 10.1*   HEMATOCRIT % 30.3* 29.9* 30.4*   PLATELETS 10*3/mm3 340 302 302          Results from last 7 days  Lab Units 07/01/18  0455 06/30/18  0416 06/29/18  0620  06/25/18  1931   SODIUM mmol/L 136 139 139  < > 134*   POTASSIUM mmol/L 4.6 4.1 4.0  < > 4.5   CHLORIDE mmol/L 99 104 105  < > 97*   CO2 mmol/L 28.0 19.0* 20.0*  < > 24.0   BUN mg/dL 15 14 16  < > 24*   CREATININE mg/dL 0.69 0.69 0.73  < > 0.91   CALCIUM mg/dL 8.3* 8.5 8.5  < > 9.0   BILIRUBIN mg/dL  --   --   --   --  1.5*   ALK PHOS U/L  --   --   --   --  74   ALT (SGPT) U/L  --   --   --   --  25   AST (SGOT) U/L  --   --   --   --  39   GLUCOSE mg/dL 133* 66* 76  < > 111*   < > = values in this interval not displayed.     Lab Results   Component Value Date    MG 1.8 06/27/2018    PROTIME 15.8 (H) 06/29/2018    INR 1.22 (H) 06/29/2018     No components found for: POCGLUC  No components found for: A1C  Lab Results   Component Value Date    HDL 36 (L) 06/26/2018    LDL 74 06/26/2018     No components found for: B12  Lab Results   Component Value Date    TSH 3.180 04/18/2017     CT of head without contrast reviewed by me performed on June 27.  There is a large area of hypoattenuation in the right posterior middle cervical artery distribution.  There is no mass effect and no hemorrhagic component.    CT angiography of the head and neck reviewed by me.  There is evidence of moderate stenoses in the intracranial segments of the right internal carotid artery.  In addition to this there is a ratty appearance of the proximal M1 portion of the right middle cerebral artery.  This does represent a significant stenoses.  The official report is below:  1. Small caliber of the right internal carotid artery, with  approximately 50% stenosis involving the cavernous portion of the right  internal carotid artery.  2. Abnormal appearance of the M1 segment of the right middle cerebral  artery with near-complete occlusion identified.  3. Please see the separate  CTA head for further details involving the  findings of the intracranial vasculature.           LDL Cholesterol  0 - 99 mg/dL 74       Hemoglobin A1C % 5.7    Cardiac echo:  · Left ventricular systolic function is normal. Estimated EF = 65%.  · Right ventricular cavity is borderline dilated.  · Left atrial cavity size is moderately dilated.  · There is moderate calcification of the aortic valve.  · Mild aortic valve regurgitation is present.  · Moderate to severe aortic valve stenosis is present.  · Mild mitral valve regurgitation is present  · Mild tricuspid valve regurgitation is present.    Assessment/Plan     Hospital Problem List    Principal Problem:    Failure to thrive in adult  Active Problems:    Cerebrovascular accident (CVA)    Impression:  1. Large right middle cerebral artery acute ischemic stroke confirmed today on CT of head without contrast likely secondary to atrial fibrillation without anticoagulation.  2.  History of atrial fibrillation taken off his anticoagulation secondary to a recent surgery for sinus issues  3.  Hypertension  4.  Previous stroke with similar symptoms in 2014  5.  Hearing loss with cochlear implant on the left  6.  Moderate to severe aortic stenoses  7.  Post stroke somnolence 2/2 R MCA stroke    Plan:  · SCD's  · Rectal ASA ( Is not tolerating PO secondary to encephalopathy and not candidate for NG 2/2 recent nasal surgery)  · Not a candidate for anticoagulation currently as I believe his stroke is large enough that it poses a significant hemorrhagic conversion risk.  · He remains nothing by mouth secondary to dysphagia.  At this time I spoke to his wife extensively about the need for a PEG tube if we are to continue supportive care for him.  In addition to this his discharge planning will likely be towards a skilled nursing facility.  She is unsure of this as well.  · PEG placed this morning - not cleared for use yet  · D/C planning to [facility]  · Will start  Amantadine once PEG cleared by GI  · Will need to start Xarelto at 2 weeks from stroke   Can restart Xarelto on 7/9/18  · Spoke with wife about possibility of long term complications post-stroke          Joe Carreno MD  07/01/18  11:00 AM

## 2018-07-01 NOTE — PROGRESS NOTES
[facility]  INPATIENT PROGRESS NOTE    Length of Stay: 6  Date of Admission: 6/25/2018  Primary Care Physician: Zia Benitez MD    Subjective   Chief Complaint: weakness, nonverbal    HPI   Laying in bed with family present.  He does respond to me better today.  He opens his eyes to touch but does not speak or move much.  Family present and anxiously awaiting approval from [facility].  Currently in atrial fibrillation.     Review of Systems   Constitutional: Positive for activity change, appetite change and fatigue. Negative for chills and fever.   HENT: Positive for trouble swallowing. Negative for hearing loss, nosebleeds and tinnitus.    Eyes: Negative for visual disturbance.   Respiratory: Negative for cough, chest tightness, shortness of breath and wheezing.    Cardiovascular: Negative for chest pain, palpitations and leg swelling.   Gastrointestinal: Negative for abdominal distention, abdominal pain, blood in stool, constipation, diarrhea, nausea and vomiting.   Endocrine: Negative for cold intolerance, heat intolerance, polydipsia, polyphagia and polyuria.   Genitourinary: Negative for decreased urine volume, difficulty urinating, dysuria, flank pain, frequency and hematuria.   Musculoskeletal: Positive for gait problem. Negative for arthralgias, joint swelling and myalgias.   Skin: Negative for rash.   Allergic/Immunologic: Negative for immunocompromised state.   Neurological: Positive for speech difficulty and weakness. Negative for dizziness, syncope, light-headedness and headaches.   Hematological: Negative for adenopathy. Does not bruise/bleed easily.   Psychiatric/Behavioral: Positive for confusion. Negative for sleep disturbance. The patient is not nervous/anxious.       All pertinent negatives and positives are as above. All other systems have been reviewed and are negative unless otherwise stated.     Objective    Temp:  [97.5 °F (36.4  °C)-98.2 °F (36.8 °C)] 97.5 °F (36.4 °C)  Heart Rate:  [] 96  Resp:  [16-20] 16  BP: (128-135)/(62-81) 131/69     Physical Exam   Constitutional: He appears well-developed and well-nourished.   HENT:   Head: Normocephalic and atraumatic.   Eyes: Conjunctivae and EOM are normal. Pupils are equal, round, and reactive to light.   Neck: Neck supple. No JVD present. No thyromegaly present.   Cardiovascular: Normal rate, normal heart sounds and intact distal pulses.  An irregular rhythm present. Exam reveals no gallop and no friction rub.    No murmur heard.  Afib    Pulmonary/Chest: Effort normal and breath sounds normal. No respiratory distress. He has no wheezes. He has no rales. He exhibits no tenderness.   Abdominal: Soft. Bowel sounds are normal. He exhibits no distension. There is no tenderness. There is no rebound and no guarding.   Abdominal binder in place, PEG tube dressing C/D/I   Musculoskeletal: He exhibits no edema, tenderness or deformity.   Lymphadenopathy:     He has no cervical adenopathy.   Neurological: He is disoriented. He displays normal reflexes. No cranial nerve deficit. He exhibits normal muscle tone.   Left arm flaccid.     Skin: Skin is warm and dry. No rash noted.   Psychiatric: He is withdrawn. Cognition and memory are impaired.   Nursing note and vitals reviewed.    Results Review:  I have reviewed the labs, radiology results, and diagnostic studies.    Laboratory Data:     Results from last 7 days  Lab Units 06/27/18  0611 06/26/18  0611 06/25/18  2347   WBC 10*3/mm3 6.60 8.66 8.65   HEMOGLOBIN g/dL 10.2* 9.9* 10.1*   HEMATOCRIT % 30.3* 29.9* 30.4*   PLATELETS 10*3/mm3 340 302 302        Results from last 7 days  Lab Units 07/01/18  0455 06/30/18  0416 06/29/18  0620  06/25/18  1931   SODIUM mmol/L 136 139 139  < > 134*   POTASSIUM mmol/L 4.6 4.1 4.0  < > 4.5   CHLORIDE mmol/L 99 104 105  < > 97*   CO2 mmol/L 28.0 19.0* 20.0*  < > 24.0   BUN mg/dL 15 14 16  < > 24*   CREATININE  mg/dL 0.69 0.69 0.73  < > 0.91   CALCIUM mg/dL 8.3* 8.5 8.5  < > 9.0   BILIRUBIN mg/dL  --   --   --   --  1.5*   ALK PHOS U/L  --   --   --   --  74   ALT (SGPT) U/L  --   --   --   --  25   AST (SGOT) U/L  --   --   --   --  39   GLUCOSE mg/dL 133* 66* 76  < > 111*   < > = values in this interval not displayed.    I have reviewed the patient's current medications.     Assessment/Plan     Assessment:  1.  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 No hemorrhage  2.  Right internal carotid artery stenosis, high-grade stenosis distal aspect  3.  Chronic atrial fibrillation on anticoagulation with xarelto       - Xarelto recently on hold 6/18/18-6/25/18 for recent sinus procedure 6/21/18  4.  History of ischemic stroke 4/2014  5.  Coronary artery disease without acute symptoms  6.  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  Chronic normocytic anemia with iron deficiency, iron 24, saturation 8%  8.  Moderate to severe aortic stenosis per echo 6/26/18    Plan:  1.  He did not receive TPA due to recent sinus surgery  2.   mg rectally, changed to PEG tube.  3.  Lipitor 80 mg nightly  4.  Neurology following  5.  PT and OT continues  6.  Speech therapy - Remains nothing by mouth.  Unable to follow any step commands throughout session. Have discussed with wife.  If patient is unable to tolerate oral intake will need to determine alternative route for medications and nutrition.  PEG tube placed 6/29/18.  7.  SCDs bilateral lower extremities for DVT prophylaxis   8.  Iron 24, saturation 8%, ALEXIA, IV Venofer day #3/3  9.  Repeat CT head 6/27 5×4 cm infarct in the posterior right MCA distribution.  Minimal mass effect.  No hemorrhage.  Underlying small vessel disease.  Not MRI candidate secondary to cochlear implant.  10.  PEG placed 6/29/18, tube feedings going well.  11.  Awaiting SNF placement - [facility]   12.  Consulted Nutrition - Jevity 1.2 @ 20 ml/hr for first 8 hours, then increase by 10 ml every 4  hours until goal of 62 ml/hr.  Water flush 25ml/hr.  Tolerating well.  Currently going at 60 ml/hr   13.  Can restart Xarelto on 7/9/18  14.  In atrial fibrillation, up to 126, may need to increase Cardizem to 60 mg from 30 mg.     Discharge Planning: I expect the patient to be discharged to SNF in 1-2 days.    RU Park   07/01/18   9:10 AM     Discussed with RU Aviles and agree with the assessment, treatment plan, and disposition of the patient as recorded by her.     Discussed with his nurse, Daisy.     About the same clinically. Awaiting to see amantadine will improve his alertness.     Tolerating TF.     On ASA and atorvastatin.     Cardizem CD changed to plain cardizem for PEG purposes.  His heart rate has been a bit fast at times.  He is allergic to metoprolol somehow.  Start digoxin elixir.    Awaiting placement options tomorrow. Gary and Dr. Snow to discuss with DYLAN Triplett,   07/01/18  12:10 PM

## 2018-07-01 NOTE — PLAN OF CARE
Problem: Patient Care Overview  Goal: Plan of Care Review  Outcome: Ongoing (interventions implemented as appropriate)   07/01/18 0651   Coping/Psychosocial   Plan of Care Reviewed With patient   Plan of Care Review   Progress no change   OTHER   Outcome Summary Pt. given Ativan x1 for increased restlessness, no neuro changes, afib on telemetry-rate controlled, heels red and blanchable, turned q2hrs, tube feeding currently at 60 ml/hr, pt. arouses to voice, safety maintained, son at bedside.        Problem: Fall Risk (Adult)  Goal: Absence of Fall  Outcome: Ongoing (interventions implemented as appropriate)      Problem: Stroke (Ischemic) (Adult)  Goal: Signs and Symptoms of Listed Potential Problems Will be Absent, Minimized or Managed (Stroke)  Outcome: Ongoing (interventions implemented as appropriate)      Problem: Skin Injury Risk (Adult)  Goal: Skin Health and Integrity  Outcome: Ongoing (interventions implemented as appropriate)      Problem: Nutrition, Enteral (Adult)  Goal: Signs and Symptoms of Listed Potential Problems Will be Absent, Minimized or Managed (Nutrition, Enteral)  Outcome: Ongoing (interventions implemented as appropriate)

## 2018-07-02 NOTE — PROGRESS NOTES
Neurology Progress Note      Chief Complaint:  stroke    Subjective     Subjective:    He was up last night talking but in the room today he is extremely somnolent.  The room is dark and quiet.  There is a family member in the room and is being quite as well.      Medications:  Current Facility-Administered Medications   Medication Dose Route Frequency Provider Last Rate Last Dose   • acetaminophen (TYLENOL) 160 MG/5ML solution 650 mg  650 mg Oral Q6H PRN Petros Triplett DO   650 mg at 06/30/18 2327   • amantadine (SYMMETREL) tablet 100 mg  100 mg Oral Q12H Joe Carreno MD   100 mg at 07/02/18 0845   • aspirin chewable tablet 81 mg  81 mg Per PEG Tube Daily Petros Triplett DO   81 mg at 07/02/18 0845   • atorvastatin (LIPITOR) tablet 80 mg  80 mg Per G Tube Nightly Gary Jenkins APRN   80 mg at 07/01/18 2135   • dextrose (D50W) solution 25 g  25 g Intravenous Q15 Min PRN Branden Leslie MD   25 g at 06/30/18 0651   • dextrose (GLUTOSE) oral gel 15 g  15 g Oral Q15 Min PRN Branden Leslie MD       • digoxin (LANOXIN) 0.05 MG/ML solution 125 mcg  125 mcg Per G Tube Daily Petros Triplett DO   125 mcg at 07/01/18 1244   • diltiaZEM (CARDIZEM) tablet 30 mg  30 mg Per G Tube Q6H Gary Jenkins APRN   30 mg at 07/02/18 0646   • enalaprilat (VASOTEC) injection 0.625 mg  0.625 mg Intravenous Q6H PRN Branden Leslie MD       • famotidine (PEPCID) tablet 20 mg  20 mg Per G Tube Daily Petros Triplett DO   20 mg at 07/02/18 0845   • glucagon (human recombinant) (GLUCAGEN DIAGNOSTIC) injection 1 mg  1 mg Subcutaneous Q15 Min PRN Branden Leslie MD       • LORazepam (ATIVAN) injection 0.5 mg  0.5 mg Intravenous Q4H PRN Branden Leslie MD   0.5 mg at 07/02/18 0456   • mupirocin (BACTROBAN) 2 % nasal ointment   Each Nare BID Eliezer Perez MD       • ondansetron (ZOFRAN) injection 4 mg  4 mg Intravenous Q6H PRN Branden Leslie MD       • oxymetazoline (AFRIN) nasal spray 2 spray  2 spray Each Nare BID  PRN RU Patel       • sodium chloride (OCEAN) nasal spray 2 spray  2 spray Each Nare 5x Daily Branden Leslie MD   2 spray at 07/02/18 0646   • sodium chloride 0.9 % flush 1-10 mL  1-10 mL Intravenous PRN Branden Leslie MD       • sodium chloride 0.9 % flush 10 mL  10 mL Intravenous PRN Tyler Yao MD           Review of Systems:   -A 14 point review of systems is completed and is negative except for Left-sided weakness      Objective      Vital Signs  Temp:  [97.3 °F (36.3 °C)-98.5 °F (36.9 °C)] 97.3 °F (36.3 °C)  Heart Rate:  [] 82  Resp:  [16-20] 20  BP: (121-151)/(74-93) 121/74    Physical Exam:    General Exam:  Head:  Normal cephalic, atraumatic  HEENT:  Neck supple.  Bandages below nose from recent surgery  Fundoscopic Exam:  No signs of disc edema  CVS:  Irregular rate and rhythm  Carotid Examination:  No bruits  Lungs:  Clear to auscultation  Abdomen:  PEG in place  Extremities:  No signs of peripheral edema  Skin:  No rashes     Neurologic Exam:     Mental Status:    -Extremely somnolent.  He does awaken when stimulated and says yes and no.     CN II:  Pupils equal  CN III, IV, VI:  No signs of nystagmus  CN V:    CN VII:  Symmetric facial grimace  CN VIII:  Gross hearing loss bilaterally  CN IX:  Palate elevates symmetrically  CN X:  Palate elevates symmetrically  CN XI:    CN XII:      Motor:     He has spontaneous movement of his right arm and leg.  He does withdraw from painful stimulation on left arm and leg with 3 out of 5 strength     DTR:  Upgoing toes bilaterally     Sensory:  -Withdraws from noxious stim relation on all 4 extremities     Coordination:  -No ataxia noted no tremors noted.     Gait  -Nonambulatory     Results Review:    I reviewed the patient's new clinical results.      Results from last 7 days  Lab Units 06/27/18  0611 06/26/18  0611 06/25/18  2347   WBC 10*3/mm3 6.60 8.66 8.65   HEMOGLOBIN g/dL 10.2* 9.9* 10.1*   HEMATOCRIT % 30.3* 29.9* 30.4*    PLATELETS 10*3/mm3 340 302 302          Results from last 7 days  Lab Units 07/02/18  0632 07/01/18  0455 06/30/18  0416  06/25/18  1931   SODIUM mmol/L 136 136 139  < > 134*   POTASSIUM mmol/L 4.1 4.6 4.1  < > 4.5   CHLORIDE mmol/L 96* 99 104  < > 97*   CO2 mmol/L 29.0 28.0 19.0*  < > 24.0   BUN mg/dL 18 15 14  < > 24*   CREATININE mg/dL 0.63 0.69 0.69  < > 0.91   CALCIUM mg/dL 8.4 8.3* 8.5  < > 9.0   BILIRUBIN mg/dL  --   --   --   --  1.5*   ALK PHOS U/L  --   --   --   --  74   ALT (SGPT) U/L  --   --   --   --  25   AST (SGOT) U/L  --   --   --   --  39   GLUCOSE mg/dL 120* 133* 66*  < > 111*   < > = values in this interval not displayed.     Lab Results   Component Value Date    MG 1.8 06/27/2018    PROTIME 15.8 (H) 06/29/2018    INR 1.22 (H) 06/29/2018     No components found for: POCGLUC  No components found for: A1C  Lab Results   Component Value Date    HDL 36 (L) 06/26/2018    LDL 74 06/26/2018     No components found for: B12  Lab Results   Component Value Date    TSH 3.180 04/18/2017     CT of head without contrast reviewed by me performed on June 27.  There is a large area of hypoattenuation in the right posterior middle cervical artery distribution.  There is no mass effect and no hemorrhagic component.    CT angiography of the head and neck reviewed by me.  There is evidence of moderate stenoses in the intracranial segments of the right internal carotid artery.  In addition to this there is a ratty appearance of the proximal M1 portion of the right middle cerebral artery.  This does represent a significant stenoses.  The official report is below:  1. Small caliber of the right internal carotid artery, with  approximately 50% stenosis involving the cavernous portion of the right  internal carotid artery.  2. Abnormal appearance of the M1 segment of the right middle cerebral  artery with near-complete occlusion identified.  3. Please see the separate CTA head for further details involving  the  findings of the intracranial vasculature.           LDL Cholesterol  0 - 99 mg/dL 74       Hemoglobin A1C % 5.7    Cardiac echo:  · Left ventricular systolic function is normal. Estimated EF = 65%.  · Right ventricular cavity is borderline dilated.  · Left atrial cavity size is moderately dilated.  · There is moderate calcification of the aortic valve.  · Mild aortic valve regurgitation is present.  · Moderate to severe aortic valve stenosis is present.  · Mild mitral valve regurgitation is present  · Mild tricuspid valve regurgitation is present.    Assessment/Plan     Hospital Problem List    Principal Problem:    Failure to thrive in adult  Active Problems:    Cerebrovascular accident (CVA)    Impression:  1. Large right middle cerebral artery acute ischemic stroke confirmed today on CT of head without contrast likely secondary to atrial fibrillation without anticoagulation.  2.  History of atrial fibrillation taken off his anticoagulation secondary to a recent surgery for sinus issues  3.  Hypertension  4.  Previous stroke with similar symptoms in 2014  5.  Hearing loss with cochlear implant on the left  6.  Moderate to severe aortic stenoses  7.  Post stroke somnolence 2/2 R MCA stroke    Plan:  · SCD's  · Rectal ASA ( Is not tolerating PO secondary to encephalopathy and not candidate for NG 2/2 recent nasal surgery)  · PEG in place and receiving nutrition  · D/C planning to [facility]  · Amantadine started via PEG for post-stroke somnolence  · Will need to start Xarelto at 2 weeks from stroke   Can restart Xarelto on 7/9/18  · Clear from neuro standpoint to D/C to SNF.  Stable          Joe Carreno MD  07/02/18  9:54 AM

## 2018-07-02 NOTE — PLAN OF CARE
Problem: Patient Care Overview  Goal: Plan of Care Review  Outcome: Outcome(s) achieved Date Met: 07/02/18 07/02/18 5685   Coping/Psychosocial   Plan of Care Reviewed With patient;spouse   Plan of Care Review   Progress no change   OTHER   Outcome Summary VSS. Patient on Jevity 1.2 @62ml/hr. Patient being discharged to [facility] today. Report called. Patient was somnolent most of shift, NIH score was skewed due to this. Patient turned q2h. Patient incont.     Goal: Individualization and Mutuality  Outcome: Outcome(s) achieved Date Met: 07/02/18    Goal: Discharge Needs Assessment  Outcome: Outcome(s) achieved Date Met: 07/02/18    Goal: Interprofessional Rounds/Family Conf  Outcome: Outcome(s) achieved Date Met: 07/02/18      Problem: Fall Risk (Adult)  Goal: Absence of Fall  Outcome: Outcome(s) achieved Date Met: 07/02/18      Problem: Stroke (Ischemic) (Adult)  Goal: Signs and Symptoms of Listed Potential Problems Will be Absent, Minimized or Managed (Stroke)  Outcome: Outcome(s) achieved Date Met: 07/02/18      Problem: Skin Injury Risk (Adult)  Goal: Skin Health and Integrity  Outcome: Outcome(s) achieved Date Met: 07/02/18      Problem: Nutrition, Enteral (Adult)  Goal: Signs and Symptoms of Listed Potential Problems Will be Absent, Minimized or Managed (Nutrition, Enteral)  Outcome: Outcome(s) achieved Date Met: 07/02/18

## 2018-07-02 NOTE — PROGRESS NOTES
Continued Stay Note   [facility]     Patient Name: Alejandro Gerber  MRN: [id number]  Today's Date: 7/2/2018    Admit Date: 6/25/2018          Discharge Plan     Row Name 07/02/18 1000       Plan    Plan Referral to [facility]    Patient/Family in Agreement with Plan yes    Plan Comments SW has asked that admissions at [facility] provide decision on this patient. Patient did receive PEG and is tolerating tube feedings well.  will follow. Patient's family has not selected alternate option and is hesitant to, unless [facility] cannot accept.                 No Renee, TAYLORW

## 2018-07-02 NOTE — PLAN OF CARE
Problem: Patient Care Overview  Goal: Plan of Care Review  Outcome: Ongoing (interventions implemented as appropriate)   07/02/18 1148   Plan of Care Review   Progress no change   OTHER   Outcome Summary Pt remains on Jevity 1.2 @ goal rate of 62ml/hr. Minimal residuals noted, tolerating well. Awaiting SNF placement, will continue to follow.

## 2018-07-02 NOTE — PLAN OF CARE
Problem: Patient Care Overview  Goal: Plan of Care Review  Outcome: Ongoing (interventions implemented as appropriate)   07/02/18 0800   Coping/Psychosocial   Plan of Care Reviewed With patient   Plan of Care Review   Progress no change   OTHER   Outcome Summary Ativan given twice for increased restlessness, no neuro changes, Jevity at 62ml/hr with minimal residual, oriented to self, drowsy this AM.        Problem: Fall Risk (Adult)  Goal: Absence of Fall  Outcome: Ongoing (interventions implemented as appropriate)      Problem: Stroke (Ischemic) (Adult)  Goal: Signs and Symptoms of Listed Potential Problems Will be Absent, Minimized or Managed (Stroke)  Outcome: Ongoing (interventions implemented as appropriate)      Problem: Skin Injury Risk (Adult)  Goal: Skin Health and Integrity  Outcome: Ongoing (interventions implemented as appropriate)      Problem: Nutrition, Enteral (Adult)  Goal: Signs and Symptoms of Listed Potential Problems Will be Absent, Minimized or Managed (Nutrition, Enteral)  Outcome: Ongoing (interventions implemented as appropriate)

## 2018-07-02 NOTE — DISCHARGE SUMMARY
[facility] Medicine Services  DISCHARGE SUMMARY       Date of Admission: 6/25/2018  Date of Discharge:  7/2/2018  Primary Care Physician: Zia Benitez MD    Presenting Problem/History of Present Illness:  Cerebrovascular accident (CVA), unspecified mechanism [I63.9]     Final Discharge Diagnoses:  1.  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 No hemorrhage  2.  Right internal carotid artery stenosis, high-grade stenosis distal aspect  3.  Chronic atrial fibrillation on anticoagulation with xarelto       - Xarelto recently on hold 6/18/18-6/25/18 for recent sinus procedure 6/21/18  4.  History of ischemic stroke 4/2014  5.  Coronary artery disease without acute symptoms  6.  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  Chronic normocytic anemia with iron deficiency, iron 24, saturation 8%  8.  Moderate to severe aortic stenosis per echo 6/26/18    Consults:   1.  Dr. Carreno, Neurology  2.  Dr. Carrero/Dr. Lobo, Gastroenterology   3.  Dr. Perez, ENT    Procedures Performed:   1.  PEG tube placed by Dr. Carrero on 6/29/18.    Pertinent Test Results:   Lab Results (last 48 hours)     Procedure Component Value Units Date/Time    POC Glucose Once [[id number]]  (Abnormal) Collected:  07/02/18 1150    Specimen:  Blood Updated:  07/02/18 1212     Glucose 148 (H) mg/dL      Comment: : [id number] Rober Cabrera ID: [id number]       Basic Metabolic Panel [[id number]]  (Abnormal) Collected:  07/02/18 0632    Specimen:  Blood Updated:  07/02/18 0722     Glucose 120 (H) mg/dL      BUN 18 mg/dL      Creatinine 0.63 mg/dL      Sodium 136 mmol/L      Potassium 4.1 mmol/L      Chloride 96 (L) mmol/L      CO2 29.0 mmol/L      Calcium 8.4 mg/dL      eGFR Non African Amer 121 mL/min/1.73      BUN/Creatinine Ratio 28.6 (H)     Anion Gap 11.0 mmol/L     Narrative:       The MDRD GFR formula is only valid for adults with stable renal function between ages 18 and 70.    POC  Glucose Once [[id number]]  (Abnormal) Collected:  07/01/18 1724    Specimen:  Blood Updated:  07/01/18 1745     Glucose 150 (H) mg/dL      Comment: : [id number] Horacio AllieMeter ID: [id number]       POC Glucose Once [[id number]]  (Normal) Collected:  07/01/18 1248    Specimen:  Blood Updated:  07/01/18 1309     Glucose 125 mg/dL      Comment: : [id number] Horacio AllieMeter ID: [id number]       Basic Metabolic Panel [[id number]]  (Abnormal) Collected:  07/01/18 0455    Specimen:  Blood Updated:  07/01/18 0535     Glucose 133 (H) mg/dL      BUN 15 mg/dL      Creatinine 0.69 mg/dL      Sodium 136 mmol/L      Potassium 4.6 mmol/L      Chloride 99 mmol/L      CO2 28.0 mmol/L      Calcium 8.3 (L) mg/dL      eGFR Non African Amer 109 mL/min/1.73      BUN/Creatinine Ratio 21.7     Anion Gap 9.0 mmol/L     Narrative:       The MDRD GFR formula is only valid for adults with stable renal function between ages 18 and 70.    POC Glucose Once [[id number]]  (Abnormal) Collected:  06/30/18 1751    Specimen:  Blood Updated:  06/30/18 1802     Glucose 151 (H) mg/dL      Comment: : [id number] Horacio AllieMeter ID: [id number]           Imaging Results (last 7 days)     Procedure Component Value Units Date/Time    FL Video Swallow With Speech [[id number]] Collected:  06/28/18 1213     Updated:  06/28/18 1554    Narrative:          HISTORY  84-year-old with dysphagia. Stroke.     REFERENCE STUDY.  None.     FINDINGS.  The patient swallowed multiple consistencies of barium showing eryn  aspiration with thin consistency. Trace laryngeal penetration with  nectar consistency.     Please see speech pathology report for further details.     IMPRESSION.  Positive for aspiration.     Fluoroscopic time 1 minute. Total images 1.     This report was finalized on 06/28/2018 15:51 by Dr Art Romero, .    CT Head Without Contrast [[id number]] Collected:  06/27/18 0733     Updated:  06/27/18 0739    Narrative:       EXAMINATION: CT  HEAD WO CONTRAST-      6/27/2018 6:28 AM CDT     HISTORY: Stroke; I63.9-Cerebral infarction, unspecified;  R13.12-Dysphagia, oropharyngeal phase     In order to have a CT radiation dose as low as reasonably achievable  Automated Exposure Control was utilized for adjustment of the mA and/or  KV according to patient size.     DLP in mGycm= 1031.     Noncontrast head CT compared with 06/25/2018.     Implanted metallic device just above the left ear produces streak  artifact.     Ventricle size is normal.     While there is mild atrophy and moderate diffuse small vessel disease  there is a superimposed large evolving nonhemorrhagic right MCA infarct  which involves an area measuring approximately 5.4 x 3.0 x 4.0 cm.  No significant mass effect at this time.  No significant midline shift.     Moderate ethmoid, sphenoid, and frontal sinus mucosal thickening.     Summary:  1. 5 x 4 cm involving infarct in the posterior right MCA distribution.  Minimal mass effect and no hemorrhage.  2. Underlying atrophy and small vessel disease.                                   This report was finalized on 06/27/2018 07:36 by Dr. Anuj Klein MD.    CT Angiogram Head With & Without Contrast [[id number]] Collected:  06/25/18 2050     Updated:  06/25/18 2103    Narrative:       EXAMINATION: CT ANGIOGRAM HEAD W WO CONTRAST- 6/25/2018 8:42 PM CDT     HISTORY: Aphasia, left-sided weakness     COMPARISON: None      DLP: 3 a 97 mGy cm     TECHNIQUE: Precontrast tomographic images of the brain were obtained.  Following the uneventful administration of Isovue contrast, serial  helical tomographic images of the brain were obtained following  angiogram protocol. Multiplanar MIP and 3D reconstructions were also  obtained.      FINDINGS:      Angiogram:   There is narrowing of the petrous portion of the right carotid artery,  though less than 50% stenosis is suspected. There is narrowing of the  cavernous portion of the right internal carotid artery  with  approximately 50% stenosis proximally. The distal aspect of the right  internal carotid artery appears to demonstrate high-grade stenosis and  is nearly completely occluded. High-grade stenosis of the M1 segment of  the right middle cerebral artery is also noted, measuring at least 60%,  though due to its small caliber, under estimation is suspected. Distal  to this, the M2 segment appears grossly normal. The left M1 and M2  segments are unremarkable. The more distal branch vessels are  unremarkable.     Bilateral anterior cerebral arteries enhance normally. The anterior  communicating artery appears grossly normal. The posterior communicating  arteries enhance normally bilaterally. The visualized basilar artery is  unremarkable. The posterior cerebral arteries appear to enhance normally  bilaterally.     Review of the visualized soft tissues demonstrates evidence of previous  sinus surgery with debris noted in the ethmoid and maxillary sinuses.  Debris is also seen in the right sphenoid sinus. A catheter extends from  the right frontal sinus to the ethmoid sinus through the frontoethmoidal  recess.          Impression:       Impression:   High-grade stenosis of the distal aspect of the right internal carotid  artery as well as the M1 segment of the right middle cerebral artery,  with stenosis estimated to measure at least 60%.  This report was finalized on 06/25/2018 21:00 by Dr. Walter Arias MD.    CT Angiogram Neck With & Without Contrast [[id number]] Collected:  06/25/18 2029     Updated:  06/25/18 2045    Narrative:       EXAMINATION: CT ANGIOGRAM NECK W WO CONTRAST- 6/25/2018 8:29 PM CDT     HISTORY: Achalasia, left-sided weakness     COMPARISON: Bilateral carotid ultrasound 4/18/2017      DLP: 397 mGy cm     TECHNIQUE: Following the uneventful administration of Isovue contrast,  serial helical tomographic images of the neck were obtained following  angiogram protocol. Multiplanar MIP and 3D  reconstructions were provided  for review.       FINDINGS:      Angiogram:   There is a left-sided aortic arch. Atherosclerotic ulcerations are seen  within the aortic arch. The aortic arch gives rise to 3 very vessels.  There has been previous CABG..      The origin of the left common carotid artery appears grossly normal. The  left common carotid artery enhances normally without vessel cut off or  significant stenosis. Atherosclerotic calcifications are seen at the  left carotid bifurcation, resulting in less than 50% stenosis at the  origin of the left internal carotid artery. Atherosclerotic  calcifications are also seen within the cavernous left internal carotid  artery without hemodynamically significant stenosis or vessel cut off.     The origin of the right common carotid artery is grossly normal in  appearance. The right common carotid artery enhances normally without  vessel cut off or significant stenosis. Minimal atherosclerotic  calcifications are seen at the aortic bifurcation. Scattered areas of  atherosclerotic calcifications are seen within the right internal  carotid artery. Areas of soft plaque are noted as well as calcified  plaque in the right cavernous internal carotid artery, resulting in  approximately 50% stenosis. There is some narrowing of the petrous  portion of the right internal carotid artery as well.     There is an abnormal appearance of the M1 segment of the right middle  cerebral artery, with an area of marked narrowing and near complete  occlusion.     The origin of the left vertebral artery is unremarkable. Left vertebral  artery appears normal in caliber without vessel cut off hemodynamically  significant stenosis. The origin of the right vertebral artery appears  grossly normal. The vertebral artery enhances normally without vessel  cut off or hemodynamically significant stenosis. The posterior inferior  cerebellar arteries appear to enhance normally. The visualized  basilar  artery is grossly normal. The visualized superior cerebellar arteries  enhance normally..      Review of the soft tissues demonstrates mild soft tissue swelling of the  right frontal scalp. There is thinning of the lenses bilaterally. There  is evidence of previous sinus surgery. A catheter is seen in the ethmoid  sinuses extending to the right frontal sinus. The airway appears patent.  There is no appreciable cervical lymphadenopathy. There appears to be a  small right pleural effusion. Pleural calcifications are noted  posteriorly on the left. Debris is seen in the trachea. The visualized  thyroid gland is unremarkable..        Impression:       Impression:   1. Small caliber of the right internal carotid artery, with  approximately 50% stenosis involving the cavernous portion of the right  internal carotid artery.  2. Abnormal appearance of the M1 segment of the right middle cerebral  artery with near-complete occlusion identified.  3. Please see the separate CTA head for further details involving the  findings of the intracranial vasculature.  This report was finalized on 06/25/2018 20:42 by Dr. Walter Arias MD.    XR Chest 1 View [[id number]] Collected:  06/25/18 2012     Updated:  06/25/18 2018    Narrative:       EXAMINATION: XR CHEST 1 VW- 6/25/2018 8:12 PM CDT     HISTORY: Acute stroke protocol     COMPARISON: 5/23/2017     FINDINGS:   Heart appears enlarged. The aorta is tortuous. There is evidence of  previous CABG. Chronic interstitial changes are noted bilaterally,  similar to the previous exam. An opacity is seen in the left mid lung  field. There is no appreciable pneumothorax or definite pleural  effusion. The pulmonary vasculature appears normal. There has been prior  granulomatous exposure.       Impression:       Opacity in the left midlung field may be related to rotation  and overlying soft tissues. Alternatively, an infectious or inflammatory  process could have this appearance.  Correlate clinically.  This report was finalized on 06/25/2018 20:15 by Dr. Walter Arias MD.    CT Head Without Contrast Stroke Protocol [[id number]] Collected:  06/25/18 1942     Updated:  06/25/18 1951    Narrative:       EXAMINATION: CT HEAD WO CONTRAST STROKE PROTOCOL- 6/25/2018 7:42 PM CDT     HISTORY: Left-sided weakness, aphasia     COMPARISON: CT head without contrast 5/23/2017     DOSE: 1228 mGy-cm     TECHNIQUE: Sequential imaging was performed from the vertex through the  base of the skull without the use of IV contrast.  Sagittal and coronal  reformations were made from the original source data and reviewed.  Automated exposure control was also utilized to decrease patient  radiation dose.     FINDINGS:   Images are degraded by streak artifact from the patient's implanted  hearing device. Images are also degraded by patient motion. There is  mild diffuse cerebral and cerebellar atrophy. There is no evidence of  acute intracranial hemorrhage, mass, or midline shift. There is no  definite evidence of acute territorial infarct. There are  periventricular and subcortical white matter changes, a nonspecific  finding most often seen as sequela of chronic microvascular ischemia.  The ventricles appear normal in configuration. The basilar cisterns are  patent. Posterior fossa appears grossly normal. The calvarium is intact.  There is evidence of previous sinus surgery with diffuse mucosal  thickening and fluid in the ethmoid and maxillary sinuses. There appears  to have been previous left mastoid surgery. No mastoid fluid is  appreciated. Calcifications are seen in the cavernous carotid arteries.  An old nasal bone fracture is noted. A catheter appears to extend into  the frontal sinuses.       Impression:       1. No acute intracranial findings appreciated.  2. Sequela of chronic microvascular ischemia.  3. Images degraded by patient motion as well as streak artifact from a  left cochlear implant.     Findings  were called to Dr. Yao in the emergency department per  stroke protocol at 7:49 pm on 6/25/18.     This report was finalized on 06/25/2018 19:47 by Dr. Walter Arias MD.        Hospital Course:  The patient is a 84 y.o. male who presented to [facility] with fall and weakness.  The patient has past medical history significant for previous stroke in April 2014 with mild left-sided residual weakness after receiving TPA, chronic atrial fibrillation on Xarelto that has been on hold since 6/18 for sinus surgery completed on 6/21.  The patient also has a history of left cochlear implant, coronary artery disease, history of prostate cancer and chronic sinus issues.  The patient was in his usual state of health until approximately 6:35 PM on the day of admission.  The wife states she heard him fall.  He landed behind the bedroom door, son soon arrived and found the patient with limpness on the left side and inability to sit up.  He was also unable to communicate.  The family thought he was having a stroke so EMS was called.  The patient was brought to [facility] for further evaluation.  In the emergency department CT head was found to be negative for any acute findings.  CTA of the head revealed high-grade stenosis of the distal right internal carotid artery as well as the M1 segment of the right middle cerebral artery.  The patient did arrive within the window for TPA however due to his recent sinus surgery and possible injury from fall decision was made per Dr. Carreno, neurologist that he would not receive TPA.    On admission the patient did not follow commands, positive  in the right hand however flaccid in the left upper extremity.  Minimal movement in the left lower extremity.  The patient was scheduled to have his sinus stents and sutures removed at [facility] on the day after admission.    Neurology was consulted and saw the patient in the emergency department.  Patient is not a  candidate for MRI secondary to his cochlear implant.  A repeat CT of the head was done on 6/27/18 that showed a 5 x 4 cm evolving infarct in the posterior right MCA distribution.  No hemorrhage noted.  Anticoagulation was held throughout his hospitalization due to concern for hemorrhagic conversion due to the size of the stroke.    Physical, occupational and speech therapy evaluated the patient.  The patient remained somnolent and lethargic.  Due to his lethargy physical therapy was never fully able to evaluate the patient.  Speech therapy deemed him a large aspiration risk.  Video swallow was obtained under fluoroscopy and showed aspiration with barium thickened liquids.  Dr. Lobo was consulted with gastroenterology for PEG placement.  The family was in agreement with this as he is still a full code and they want aggressive treatment.  Dr. Carrero placed a PEG tube on 6/29/18 and was cleared for use on 6/30/18.  Nutrition was consulted and recommended Jevity 1.2 for tube feedings.  These were gradually started and increased per dietary recommendations.  The patient is now at goal at 62 ML/HR tolerating well.  PEG tube dressing is clean dry and intact.  PEG tube site looks good without erythema.    The patient continued to be somnolent and Dr. Carreno started the patient on amantadine and hopes to help his alertness.    The patient has history of chronic atrial fibrillation and was on long-acting diltiazem.  This is been transitioned to short acting diltiazem to be able to administer through his PEG tube.  This has caused his atrial fibrillation to increase in rate therefore digoxin was added.  Heart rate has been more controlled since then.  He will remain on the short acting Cardizem and digoxin at discharge.    Dr. Eliezer Perez evaluated the patient and contacted [facility] for operative records and instructions regarding his stents.  On 6/28/18 he did a nasal endoscopy with removal of stents at the bedside.  The  "patient tolerated this well and there was no significant bleeding.    [facility] per the family.  He is stable and good condition for discharge home today per medicine and neurologic services.  He will continue treatment at the nursing home and receive rehabilitation from therapy services as he is able.  Tube feedings are to continue.  He will also remain nothing by mouth until further reevaluation.    Condition on Discharge:  Stable     Physical Exam on Discharge:  /76 (BP Location: Left arm)   Pulse 95   Temp 97.2 °F (36.2 °C) (Axillary)   Resp 17   Ht 165.1 cm (65\")   Wt 58.7 kg (129 lb 6 oz)   SpO2 98%   BMI 21.53 kg/m²      Physical Exam  Constitutional: He appears well-developed and well-nourished.   HENT:   Head: Normocephalic and atraumatic.   Eyes: Conjunctivae and EOM are normal. Pupils are equal, round, and reactive to light.   Neck: Neck supple. No JVD present. No thyromegaly present.   Cardiovascular: Normal rate, normal heart sounds and intact distal pulses.  An irregular rhythm present. Exam reveals no gallop and no friction rub.    No murmur heard.  Afib    Pulmonary/Chest: Effort normal and breath sounds normal. No respiratory distress. He has no wheezes. He has no rales. He exhibits no tenderness.   Abdominal: Soft. Bowel sounds are normal. He exhibits no distension. There is no tenderness. There is no rebound and no guarding.   Abdominal binder in place, PEG tube dressing C/D/I   Musculoskeletal: He exhibits no edema, tenderness or deformity.   Lymphadenopathy:     He has no cervical adenopathy.   Neurological: He is disoriented. He displays normal reflexes. No cranial nerve deficit. He exhibits normal muscle tone.   Left arm flaccid.     Skin: Skin is warm and dry. No rash noted.   Psychiatric: He is withdrawn. Cognition and memory are impaired.   Nursing note and vitals reviewed.    Discharge Disposition:  Skilled Nursing " Facility (DC - External)    Discharge Medications:     Discharge Medications      New Medications      Instructions Start Date   amantadine 100 MG tablet  Commonly known as:  SYMMETREL   100 mg, Oral, Every 12 Hours Scheduled      aspirin 81 MG chewable tablet   81 mg, Per G Tube, Daily   Start Date:  7/3/2018     digoxin 0.05 MG/ML solution  Commonly known as:  LANOXIN   125 mcg, Per G Tube, Daily Digoxin   Start Date:  7/3/2018     diltiaZEM 30 MG tablet  Commonly known as:  CARDIZEM   30 mg, Per G Tube, Every 6 Hours Scheduled      famotidine 20 MG tablet  Commonly known as:  PEPCID   20 mg, Per G Tube, Daily   Start Date:  7/3/2018     LORazepam 0.5 MG tablet  Commonly known as:  ATIVAN   0.5 mg, Oral, Every 6 Hours PRN      mupirocin 2 % nasal ointment  Commonly known as:  BACTROBAN   Each Nare, 2 Times Daily      oxymetazoline 0.05 % nasal spray  Commonly known as:  AFRIN   2 sprays, Nasal, 2 Times Daily PRN      sodium chloride 0.65 % nasal spray  Commonly known as:  OCEAN   2 sprays, Nasal, 5 Times Daily         Continue These Medications      Instructions Start Date   atorvastatin 80 MG tablet  Commonly known as:  LIPITOR   80 mg, Oral, Daily      fluticasone 50 MCG/ACT nasal spray  Commonly known as:  FLONASE   2 sprays, Nasal, Daily      rivaroxaban 20 MG tablet  Commonly known as:  XARELTO   20 mg, Oral, Daily   Start Date:  7/9/2018        Stop These Medications    diltiaZEM  MG 24 hr capsule  Commonly known as:  CARDIZEM CD          Discharge Diet:   Diet Instructions     Diet: Tube Feeding; Continuous; Jevity 1.2 @ 62 ml/hr with 25 ml/hr water flush       Discharge Diet:  Tube Feeding    Feeding Type:  Continuous    Formula & Rate:  Jevity 1.2 @ 62 ml/hr with 25 ml/hr water flush        Activity at Discharge:   Activity Instructions     Activity as Tolerated           Discharge Instructions:  1.  Resume Xarelto on 7/9/18.    Follow-up Appointments:   1.  To be seen by NH provider in one  week.  2.  Follow up with Neurology 3-6 weeks.  Future Appointments  Date Time Provider Department Center   5/6/2019 10:45 AM Mainor Brown MD [facility] None     Test Results Pending at Discharge: None    [facility]  07/02/18  12:53 PM    Time: 35 minutes

## 2018-07-02 NOTE — PROGRESS NOTES
[facility] Medicine Services  INPATIENT PROGRESS NOTE    Length of Stay: 7  Date of Admission: 6/25/2018  Primary Care Physician: Zia Benitez MD    Subjective   Chief Complaint: weakness, nonverbal    HPI   Laying in bed with family present.  He is very withdrawn and lethargic today.  Family now has blinds open and the TV on.  [facility] has been in to speak with the family today.  Hoping on a decision soon.  He is stable for discharge.  Unable to arouse enough to work with therapy.    Review of Systems   Constitutional: Positive for activity change, appetite change and fatigue. Negative for chills and fever.   HENT: Positive for trouble swallowing. Negative for hearing loss, nosebleeds and tinnitus.    Eyes: Negative for visual disturbance.   Respiratory: Negative for cough, chest tightness, shortness of breath and wheezing.    Cardiovascular: Negative for chest pain, palpitations and leg swelling.   Gastrointestinal: Negative for abdominal distention, abdominal pain, blood in stool, constipation, diarrhea, nausea and vomiting.   Endocrine: Negative for cold intolerance, heat intolerance, polydipsia, polyphagia and polyuria.   Genitourinary: Negative for decreased urine volume, difficulty urinating, dysuria, flank pain, frequency and hematuria.   Musculoskeletal: Positive for gait problem. Negative for arthralgias, joint swelling and myalgias.   Skin: Negative for rash.   Allergic/Immunologic: Negative for immunocompromised state.   Neurological: Positive for speech difficulty and weakness. Negative for dizziness, syncope, light-headedness and headaches.   Hematological: Negative for adenopathy. Does not bruise/bleed easily.   Psychiatric/Behavioral: Positive for confusion. Negative for sleep disturbance. The patient is not nervous/anxious.       All pertinent negatives and positives are as above. All other systems have been reviewed and are negative unless otherwise  stated.     Objective    Temp:  [97.3 °F (36.3 °C)-98.5 °F (36.9 °C)] 97.3 °F (36.3 °C)  Heart Rate:  [] 82  Resp:  [16-20] 20  BP: (121-151)/(74-93) 121/74     Physical Exam   Constitutional: He appears well-developed and well-nourished.   HENT:   Head: Normocephalic and atraumatic.   Eyes: Conjunctivae and EOM are normal. Pupils are equal, round, and reactive to light.   Neck: Neck supple. No JVD present. No thyromegaly present.   Cardiovascular: Normal rate, normal heart sounds and intact distal pulses.  An irregular rhythm present. Exam reveals no gallop and no friction rub.    No murmur heard.  Afib    Pulmonary/Chest: Effort normal and breath sounds normal. No respiratory distress. He has no wheezes. He has no rales. He exhibits no tenderness.   Abdominal: Soft. Bowel sounds are normal. He exhibits no distension. There is no tenderness. There is no rebound and no guarding.   Abdominal binder in place, PEG tube dressing C/D/I   Musculoskeletal: He exhibits no edema, tenderness or deformity.   Lymphadenopathy:     He has no cervical adenopathy.   Neurological: He is disoriented. He displays normal reflexes. No cranial nerve deficit. He exhibits normal muscle tone.   Left arm flaccid.     Skin: Skin is warm and dry. No rash noted.   Psychiatric: He is withdrawn. Cognition and memory are impaired.   Nursing note and vitals reviewed.    Results Review:  I have reviewed the labs, radiology results, and diagnostic studies.    Laboratory Data:     Results from last 7 days  Lab Units 06/27/18  0611 06/26/18  0611 06/25/18  2347   WBC 10*3/mm3 6.60 8.66 8.65   HEMOGLOBIN g/dL 10.2* 9.9* 10.1*   HEMATOCRIT % 30.3* 29.9* 30.4*   PLATELETS 10*3/mm3 340 302 302        Results from last 7 days  Lab Units 07/02/18  0632 07/01/18  0455 06/30/18  0416  06/25/18  1931   SODIUM mmol/L 136 136 139  < > 134*   POTASSIUM mmol/L 4.1 4.6 4.1  < > 4.5   CHLORIDE mmol/L 96* 99 104  < > 97*   CO2 mmol/L 29.0 28.0 19.0*  < >  24.0   BUN mg/dL 18 15 14  < > 24*   CREATININE mg/dL 0.63 0.69 0.69  < > 0.91   CALCIUM mg/dL 8.4 8.3* 8.5  < > 9.0   BILIRUBIN mg/dL  --   --   --   --  1.5*   ALK PHOS U/L  --   --   --   --  74   ALT (SGPT) U/L  --   --   --   --  25   AST (SGOT) U/L  --   --   --   --  39   GLUCOSE mg/dL 120* 133* 66*  < > 111*   < > = values in this interval not displayed.    I have reviewed the patient's current medications.     Assessment/Plan     Assessment:  1.  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 No hemorrhage  2.  Right internal carotid artery stenosis, high-grade stenosis distal aspect  3.  Chronic atrial fibrillation on anticoagulation with xarelto       - Xarelto recently on hold 6/18/18-6/25/18 for recent sinus procedure 6/21/18  4.  History of ischemic stroke 4/2014  5.  Coronary artery disease without acute symptoms  6.  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  Chronic normocytic anemia with iron deficiency, iron 24, saturation 8%  8.  Moderate to severe aortic stenosis per echo 6/26/18    Plan:  1.  He did not receive TPA due to recent sinus surgery  2.   mg rectally, changed to PEG tube.  3.  Lipitor 80 mg nightly  4.  Neurology cleared for discharge to SNF.  5.  PT and OT continues  6.  Speech therapy - Remains nothing by mouth.  Unable to follow any step commands throughout session. Have discussed with wife.  If patient is unable to tolerate oral intake will need to determine alternative route for medications and nutrition.  PEG tube placed 6/29/18.  7.  SCDs bilateral lower extremities for DVT prophylaxis   8.  Iron 24, saturation 8%, ALEXIA, IV Venofer day #3/3  9.  Repeat CT head 6/27 5×4 cm infarct in the posterior right MCA distribution.  Minimal mass effect.  No hemorrhage.  Underlying small vessel disease.  Not MRI candidate secondary to cochlear implant.  10.  PEG placed 6/29/18, tube feedings going well.  11.  Awaiting SNF placement - [facility]   12.  Consulted Nutrition -  Jevity 1.2 @ 20 ml/hr for first 8 hours, then increase by 10 ml every 4 hours until goal of 62 ml/hr.  Water flush 25ml/hr.  Tolerating well.  Currently at goal of 62 ml/hr.  13.  Can restart Xarelto on 7/9/18  14.  Started Digoxin yesterday    Discharge Planning: I expect the patient to be discharged to SNF in 1-2 days.    RU Park   07/02/18   10:34 AM

## 2018-07-02 NOTE — PROGRESS NOTES
Continued Stay Note  [facility]     Patient Name: Alejandro Gerber  MRN: [id number]  Today's Date: 7/2/2018    Admit Date: 6/25/2018          Discharge Plan     Row Name 07/02/18 1200       Plan    Plan [facility]     Patient/Family in Agreement with Plan yes    Final Discharge Disposition Code 03 - skilled nursing facility (SNF)    Final Note Patient has been accepted to [facility]. SW notified Gary, RU and Gary confirmed she plans to discharge patient today. Discharge summary, discharge med list, and a copy of scripts to be faxed to facility at [phone]. Patient's nurse will call report to [phone]. Plan EMS transport.           GEOVANNY Chicas

## 2018-07-03 NOTE — THERAPY DISCHARGE NOTE
Acute Care - Occupational Therapy Discharge Summary  [facility]     Patient Name: Alejandro Gerber  : 1934  MRN: [id number]    Today's Date: 7/3/2018  Onset of Illness/Injury or Date of Surgery: 18    Date of Referral to OT: 18  Referring Physician: Dr. Leslie      Admit Date: 2018        OT Recommendation and Plan    Visit Dx:    ICD-10-CM ICD-9-CM   1. Cerebrovascular accident (CVA), unspecified mechanism (CMS/HCC) I63.9 434.91   2. Oropharyngeal dysphagia R13.12 787.22   3. Impaired mobility and ADLs Z74.09 799.89   4. Failure to thrive in adult R62.7 783.7                     OT Rehab Goals     Row Name 18 0740             Bed Mobility Goal 1 (OT)    Activity/Assistive Device (Bed Mobility Goal 1, OT) sit to supine;supine to sit  -TS      G[place]ley Level/Cues Needed (Bed Mobility Goal 1, OT) moderate assist (50-74% patient effort)  -TS      Time Frame (Bed Mobility Goal 1, OT) 10 days  -TS      Progress/Outcomes (Bed Mobility Goal 1, OT) goal not met  -TS         Grooming Goal 1 (OT)    Activity/Device (Grooming Goal 1, OT) grooming skills, all  -TS      [place] (Grooming Goal 1, OT) minimum assist (75% or more patient effort)  -TS      Time Frame (Grooming Goal 1, OT) 10 days  -TS      Progress/Outcome (Grooming Goal 1, OT) goal not met  -TS         Direction Following Goal 1 (OT)    Activity (Direction Following Goal 1, OT) follow one step directions  -TS      [place]/Cues/Accuracy (Direction Following Goal 1, OT) with minimum;physical/tactile cues;verbal cues/redirection;with 60% accuracy  -TS      Time Frame (Direction Following Goal 1, OT) 10 days  -TS      Progress/Outcome (Direction Following Goal 1, OT) goal not met  -TS        User Key  (r) = Recorded By, (t) = Taken By, (c) = Cosigned By    Initials Name Provider Type Discipline    GHISLAINE Wilkerson/L Occupational Therapy Assistant OT              Therapy Suggested Charges     Code   Minutes Charges     None                 OT Discharge Summary  Reason for Discharge: Discharge from facility  Outcomes Achieved: Refer to plan of care for updates on goals achieved  Discharge Destination: SNF      KASSI Gutierrez  7/3/2018

## 2018-07-18 NOTE — TELEPHONE ENCOUNTER
I s/w patient's son, and told him of the new appointment date & time.  Explained that I was unable to reach her Dad on their number.

## 2018-07-23 NOTE — TELEPHONE ENCOUNTER
Called to remind Mr Buzz of his appointment for Tuesday, July 24th, 2018 at 11 am. Rang several times with no answer.

## 2018-07-31 NOTE — TELEPHONE ENCOUNTER
Nursing home called and said they needed to reschedule the patient's appt from today, because he was not himself and the patient's family requested a morning appt for the next one.    I reschedule him for 8/2/2018.

## 2018-08-03 PROBLEM — R06.03 RESPIRATORY DISTRESS: Status: ACTIVE | Noted: 2018-01-01

## 2018-10-03 ENCOUNTER — CALL CENTER PROGRAMS (OUTPATIENT)
Dept: CALL CENTER | Facility: HOSPITAL | Age: 83
End: 2018-10-03

## 2018-10-03 NOTE — OUTREACH NOTE
Stroke Yazan Survey      Responses   Facility patient discharged from?  [facility]   Attempt successful  No   Revoke     Patient  score     Call Center Sawyer Score  6          Shaina Weller RN

## (undated) DEVICE — CUFF,BP,DISP,1 TUBE,ADULT,HP: Brand: MEDLINE

## (undated) DEVICE — SENSR O2 OXIMAX FNGR A/ 18IN NONSTR

## (undated) DEVICE — TBG SMPL FLTR LINE NASL 02/C02 A/ BX/100

## (undated) DEVICE — ENDOGATOR AUXILIARY WATER JET CONNECTOR: Brand: ENDOGATOR

## (undated) DEVICE — CONMED SCOPE SAVER BITE BLOCK, 20X27 MM: Brand: SCOPE SAVER

## (undated) DEVICE — Device: Brand: DEFENDO AIR/WATER/SUCTION AND BIOPSY VALVE

## (undated) DEVICE — THE CHANNEL CLEANING BRUSH IS A NYLON FLEXI BRUSH ATTACHED TO A FLEXIBLE PLASTIC SHEATH DESIGNED TO SAFELY REMOVE DEBRIS FROM FLEXIBLE ENDOSCOPES.